# Patient Record
Sex: FEMALE | Race: WHITE | NOT HISPANIC OR LATINO | Employment: UNEMPLOYED | URBAN - METROPOLITAN AREA
[De-identification: names, ages, dates, MRNs, and addresses within clinical notes are randomized per-mention and may not be internally consistent; named-entity substitution may affect disease eponyms.]

---

## 2023-04-26 DIAGNOSIS — F41.1 GENERALIZED ANXIETY DISORDER: ICD-10-CM

## 2023-04-26 PROBLEM — H92.09 EAR PAIN: Status: ACTIVE | Noted: 2023-04-26

## 2023-04-26 PROBLEM — O24.419 GESTATIONAL DIABETES MELLITUS (HHS-HCC): Status: ACTIVE | Noted: 2023-04-26

## 2023-04-26 PROBLEM — H61.21 IMPACTED CERUMEN OF RIGHT EAR: Status: ACTIVE | Noted: 2023-04-26

## 2023-04-26 PROBLEM — H93.8X1 SENSATION OF FULLNESS IN RIGHT EAR: Status: ACTIVE | Noted: 2023-04-26

## 2023-04-26 PROBLEM — H66.92 OTITIS MEDIA, LEFT: Status: ACTIVE | Noted: 2023-04-26

## 2023-04-26 PROBLEM — J32.9 SINUSITIS: Status: ACTIVE | Noted: 2023-04-26

## 2023-04-26 RX ORDER — VIT C/E/ZN/COPPR/LUTEIN/ZEAXAN 250MG-90MG
CAPSULE ORAL
COMMUNITY

## 2023-04-26 RX ORDER — SERTRALINE HYDROCHLORIDE 50 MG/1
50 TABLET, FILM COATED ORAL DAILY
Qty: 90 TABLET | Refills: 0 | Status: SHIPPED | OUTPATIENT
Start: 2023-04-26 | End: 2023-06-30 | Stop reason: WASHOUT

## 2023-04-26 RX ORDER — MULTIVIT-MIN/IRON/FOLIC ACID/K 18-600-40
CAPSULE ORAL
COMMUNITY

## 2023-04-26 RX ORDER — SERTRALINE HYDROCHLORIDE 50 MG/1
50 TABLET, FILM COATED ORAL DAILY
COMMUNITY
End: 2023-05-03 | Stop reason: ALTCHOICE

## 2023-05-01 PROBLEM — N75.1 ABSCESS OF BARTHOLIN'S GLAND: Status: ACTIVE | Noted: 2023-03-15

## 2023-05-01 RX ORDER — ONDANSETRON 4 MG/1
4 TABLET, ORALLY DISINTEGRATING ORAL
COMMUNITY
Start: 2023-03-15 | End: 2023-05-03 | Stop reason: ALTCHOICE

## 2023-05-01 ASSESSMENT — PROMIS GLOBAL HEALTH SCALE
EMOTIONAL_PROBLEMS: OFTEN
RATE_SOCIAL_SATISFACTION: FAIR
RATE_PHYSICAL_HEALTH: GOOD
RATE_MENTAL_HEALTH: FAIR
RATE_GENERAL_HEALTH: GOOD
CARRYOUT_PHYSICAL_ACTIVITIES: COMPLETELY
RATE_AVERAGE_FATIGUE: MILD
RATE_AVERAGE_PAIN: 0
CARRYOUT_SOCIAL_ACTIVITIES: GOOD
RATE_QUALITY_OF_LIFE: GOOD

## 2023-05-02 PROBLEM — H61.21 IMPACTED CERUMEN OF RIGHT EAR: Status: RESOLVED | Noted: 2023-04-26 | Resolved: 2023-05-02

## 2023-05-02 PROBLEM — J32.9 SINUSITIS: Status: RESOLVED | Noted: 2023-04-26 | Resolved: 2023-05-02

## 2023-05-02 PROBLEM — H92.09 EAR PAIN: Status: RESOLVED | Noted: 2023-04-26 | Resolved: 2023-05-02

## 2023-05-02 PROBLEM — E87.6 ACUTE HYPOKALEMIA: Status: ACTIVE | Noted: 2023-03-15

## 2023-05-02 PROBLEM — H66.92 OTITIS MEDIA, LEFT: Status: RESOLVED | Noted: 2023-04-26 | Resolved: 2023-05-02

## 2023-05-02 PROBLEM — H93.8X1 SENSATION OF FULLNESS IN RIGHT EAR: Status: RESOLVED | Noted: 2023-04-26 | Resolved: 2023-05-02

## 2023-05-02 PROBLEM — F41.9 ANXIETY: Status: ACTIVE | Noted: 2023-05-02

## 2023-05-02 NOTE — PROGRESS NOTES
"Subjective   Patient ID: Alina Carter is a 33 y.o. female who presents for Annual Exam and Anxiety.    HPI     The patient presents for her annual wellness exam. GYN- Sandra  Had bartholin's cyst drained by Dr. Flores- both sides- then had right marsupialization surgery- doing much better now   Last pap/cervical cancer screenin   LMP/menstrual cycles: monthly, normal   Contraception:  vasectomy   History of depression or anxiety: anxiety- on zoloft 50 mg- seems like anxiety has been worse since she went to [place] in February- most days just feeling more anxious, would like to go up on dose   Immunizations: utd   Diet: Follows a healthy diet  Exercise: Gets regular exercise - walks , yoga     Review of Systems   Constitutional:  Negative for chills, fatigue and fever.   HENT:  Negative for congestion, ear pain and sore throat.    Eyes:  Negative for visual disturbance.   Respiratory:  Negative for cough and shortness of breath.    Cardiovascular:  Negative for chest pain, palpitations and leg swelling.   Gastrointestinal:  Negative for abdominal pain, constipation, diarrhea, nausea and vomiting.   Genitourinary: Negative.    Musculoskeletal: Negative.    Skin:  Negative for rash.   Neurological:  Negative for dizziness, weakness, numbness and headaches.   Psychiatric/Behavioral: Negative.         Objective   /73 (BP Location: Left arm, Patient Position: Sitting, BP Cuff Size: Adult)   Pulse 78   Temp 36.5 °C (97.7 °F) (Temporal)   Resp 16   Ht 1.702 m (5' 7\")   Wt 55.3 kg (122 lb)   LMP 2023 (Exact Date)   SpO2 99%   BMI 19.11 kg/m²     Physical Exam    Constitutional: Well developed, well nourished, alert and in no acute distress.  Head and Face: Normocephalic, atraumatic.  Eyes: Normal external exam. Pupils equally round and reactive to light with normal accommodation and extraocular movements intact.   ENT: External inspection of ears normal, tympanic membranes visualized and " normal. Nasal mucosa, septum, and turbinates normal. Oral mucosa moist, oropharynx clear.   Neck: Supple, no lymphadenopathy or masses. Thyroid not enlarged, no palpable nodules.   Cardiovascular: Regular rate and rhythm, normal S1 and S2, no murmurs, gallops, or rubs. Radial pulses normal. No peripheral edema. No carotid bruits.   Pulmonary: No respiratory distress, lungs clear to auscultation bilaterally. No wheezes, rhonchi, rales.   Abdomen: Soft, nontender, nondistended, normal bowel sounds. No masses palpated.   Musculoskeletal: Gait normal. Muscle strength/tone normal of all 4 extremities. Normal range of motion of all extremities.   Skin: Warm, well perfused, normal skin turgor and color, no lesions or rashes noted.   Neurologic: Cranial nerves II-XII grossly intact. Sensation normal bilaterally.   Psychiatric: Mood calm and affect normal.      Assessment/Plan   Problem List Items Addressed This Visit          Endocrine/Metabolic    Gestational diabetes mellitus    Relevant Orders    Hemoglobin A1C       Other    Generalized anxiety disorder     Increase zoloft to 75 mg daily   Counseling info given   Follow up as needed          Relevant Medications    sertraline (Zoloft) 50 mg tablet     Other Visit Diagnoses       Annual physical exam    -  Primary    Relevant Orders    CBC and Auto Differential    Basic Metabolic Panel    TSH with reflex to Free T4 if abnormal    Vitamin D 25-Hydroxy,Total    Follow Up In [facility] - PCP          Cherise Soni,   5/3/2023

## 2023-05-03 ENCOUNTER — LAB (OUTPATIENT)
Dept: LAB | Facility: LAB | Age: 33
End: 2023-05-03
Payer: COMMERCIAL

## 2023-05-03 ENCOUNTER — OFFICE VISIT (OUTPATIENT)
Dept: PRIMARY CARE | Facility: CLINIC | Age: 33
End: 2023-05-03
Payer: COMMERCIAL

## 2023-05-03 VITALS
HEART RATE: 78 BPM | WEIGHT: 122 LBS | OXYGEN SATURATION: 99 % | RESPIRATION RATE: 16 BRPM | DIASTOLIC BLOOD PRESSURE: 73 MMHG | BODY MASS INDEX: 19.15 KG/M2 | SYSTOLIC BLOOD PRESSURE: 108 MMHG | HEIGHT: 67 IN | TEMPERATURE: 97.7 F

## 2023-05-03 DIAGNOSIS — O24.419 GESTATIONAL DIABETES MELLITUS (GDM), ANTEPARTUM, GESTATIONAL DIABETES METHOD OF CONTROL UNSPECIFIED (HHS-HCC): ICD-10-CM

## 2023-05-03 DIAGNOSIS — Z00.00 ANNUAL PHYSICAL EXAM: ICD-10-CM

## 2023-05-03 DIAGNOSIS — Z00.00 ANNUAL PHYSICAL EXAM: Primary | ICD-10-CM

## 2023-05-03 DIAGNOSIS — F41.1 GENERALIZED ANXIETY DISORDER: ICD-10-CM

## 2023-05-03 PROBLEM — E87.6 ACUTE HYPOKALEMIA: Status: RESOLVED | Noted: 2023-03-15 | Resolved: 2023-05-03

## 2023-05-03 PROBLEM — F41.9 ANXIETY: Status: RESOLVED | Noted: 2023-05-02 | Resolved: 2023-05-03

## 2023-05-03 PROBLEM — N75.1 ABSCESS OF BARTHOLIN'S GLAND: Status: RESOLVED | Noted: 2023-03-15 | Resolved: 2023-05-03

## 2023-05-03 PROCEDURE — 83036 HEMOGLOBIN GLYCOSYLATED A1C: CPT

## 2023-05-03 PROCEDURE — 3078F DIAST BP <80 MM HG: CPT | Performed by: FAMILY MEDICINE

## 2023-05-03 PROCEDURE — 80048 BASIC METABOLIC PNL TOTAL CA: CPT

## 2023-05-03 PROCEDURE — 84443 ASSAY THYROID STIM HORMONE: CPT

## 2023-05-03 PROCEDURE — 1036F TOBACCO NON-USER: CPT | Performed by: FAMILY MEDICINE

## 2023-05-03 PROCEDURE — 82306 VITAMIN D 25 HYDROXY: CPT

## 2023-05-03 PROCEDURE — 85025 COMPLETE CBC W/AUTO DIFF WBC: CPT

## 2023-05-03 PROCEDURE — 99395 PREV VISIT EST AGE 18-39: CPT | Performed by: FAMILY MEDICINE

## 2023-05-03 PROCEDURE — 3074F SYST BP LT 130 MM HG: CPT | Performed by: FAMILY MEDICINE

## 2023-05-03 PROCEDURE — 36415 COLL VENOUS BLD VENIPUNCTURE: CPT

## 2023-05-03 RX ORDER — SERTRALINE HYDROCHLORIDE 50 MG/1
75 TABLET, FILM COATED ORAL DAILY
Qty: 135 TABLET | Refills: 3 | Status: SHIPPED | OUTPATIENT
Start: 2023-05-03 | End: 2023-07-26 | Stop reason: SDUPTHER

## 2023-05-03 ASSESSMENT — ENCOUNTER SYMPTOMS
VOMITING: 0
DIARRHEA: 0
DIZZINESS: 0
MUSCULOSKELETAL NEGATIVE: 1
SHORTNESS OF BREATH: 0
PSYCHIATRIC NEGATIVE: 1
NUMBNESS: 0
CHILLS: 0
WEAKNESS: 0
CONSTIPATION: 0
FEVER: 0
NAUSEA: 0
FATIGUE: 0
PALPITATIONS: 0
HEADACHES: 0
ABDOMINAL PAIN: 0
COUGH: 0
SORE THROAT: 0

## 2023-05-03 ASSESSMENT — ANXIETY QUESTIONNAIRES
4. TROUBLE RELAXING: MORE THAN HALF THE DAYS
IF YOU CHECKED OFF ANY PROBLEMS ON THIS QUESTIONNAIRE, HOW DIFFICULT HAVE THESE PROBLEMS MADE IT FOR YOU TO DO YOUR WORK, TAKE CARE OF THINGS AT HOME, OR GET ALONG WITH OTHER PEOPLE: SOMEWHAT DIFFICULT
5. BEING SO RESTLESS THAT IT IS HARD TO SIT STILL: SEVERAL DAYS
1. FEELING NERVOUS, ANXIOUS, OR ON EDGE: MORE THAN HALF THE DAYS
7. FEELING AFRAID AS IF SOMETHING AWFUL MIGHT HAPPEN: SEVERAL DAYS
6. BECOMING EASILY ANNOYED OR IRRITABLE: MORE THAN HALF THE DAYS
3. WORRYING TOO MUCH ABOUT DIFFERENT THINGS: MORE THAN HALF THE DAYS
GAD7 TOTAL SCORE: 12
2. NOT BEING ABLE TO STOP OR CONTROL WORRYING: MORE THAN HALF THE DAYS

## 2023-05-03 ASSESSMENT — PATIENT HEALTH QUESTIONNAIRE - PHQ9
1. LITTLE INTEREST OR PLEASURE IN DOING THINGS: NOT AT ALL
2. FEELING DOWN, DEPRESSED OR HOPELESS: NOT AT ALL
SUM OF ALL RESPONSES TO PHQ9 QUESTIONS 1 AND 2: 0

## 2023-05-03 NOTE — PATIENT INSTRUCTIONS
Recommendations for women annual wellness exam:   Make sure screenings for cervical and breast cancer are up to date if applicable- pap smears age 21-65, discuss mammogram starting at age 40 or sooner if positive family history of breast cancer; colonoscopy at age 45, earlier if positive family history of colon cancer   STD screening   Follow a healthy diet (Dash diet, Mediterranean diet)  Exercise 150 min/wk   Maintain healthy weight (BMI < 25)   Do not smoke   Alcohol in moderation (up to 1 drink/day)  Get enough sleep (7-8 hours/night)  Take a prenatal vitamin with folic acid if possibility of pregnancy   Make sure immunizations are up to date (influenza, Tdap)  Premenopausal women need minimum 1,000 mg calcium and 600-800 IU vitamin D daily (combination of diet + supplement)  Talk to your physician if you have concerns about depression or anxiety  Visit dentist twice yearly    [facility]  [address] [place] [address]  [phone]    [facility]  [email or web address]  [address] [facility]  [place] [address]  Phone [phone]    [facility]  <[email or web address]  [address] [place] [address]   Phone [phone]    [facility]  [address] [address]  [phone]    [facility]  [address] [address]  [phone]     [facility]   [email or web address]   [address] [place] [address]  [phone]    [facility]  [address] [facility] [address] [address]  [phone]    [facility]  <[email or web address]  [address] [facility] [address]   Phone [phone]    [facility]  <[email or web address]  [address] [place] [address]  [phone]    [facility]  <[email or web address]   [address] [place] [address], [facility] [address], [place] [address]  Phone: [phone]

## 2023-05-04 LAB
ANION GAP IN SER/PLAS: 12 MMOL/L (ref 10–20)
BASOPHILS (10*3/UL) IN BLOOD BY AUTOMATED COUNT: 0.04 X10E9/L (ref 0–0.1)
BASOPHILS/100 LEUKOCYTES IN BLOOD BY AUTOMATED COUNT: 0.6 % (ref 0–2)
CALCIDIOL (25 OH VITAMIN D3) (NG/ML) IN SER/PLAS: 31 NG/ML
CALCIUM (MG/DL) IN SER/PLAS: 10 MG/DL (ref 8.6–10.6)
CARBON DIOXIDE, TOTAL (MMOL/L) IN SER/PLAS: 28 MMOL/L (ref 21–32)
CHLORIDE (MMOL/L) IN SER/PLAS: 103 MMOL/L (ref 98–107)
CREATININE (MG/DL) IN SER/PLAS: 0.79 MG/DL (ref 0.5–1.05)
EOSINOPHILS (10*3/UL) IN BLOOD BY AUTOMATED COUNT: 0.13 X10E9/L (ref 0–0.7)
EOSINOPHILS/100 LEUKOCYTES IN BLOOD BY AUTOMATED COUNT: 2 % (ref 0–6)
ERYTHROCYTE DISTRIBUTION WIDTH (RATIO) BY AUTOMATED COUNT: 12.3 % (ref 11.5–14.5)
ERYTHROCYTE MEAN CORPUSCULAR HEMOGLOBIN CONCENTRATION (G/DL) BY AUTOMATED: 32.5 G/DL (ref 32–36)
ERYTHROCYTE MEAN CORPUSCULAR VOLUME (FL) BY AUTOMATED COUNT: 89 FL (ref 80–100)
ERYTHROCYTES (10*6/UL) IN BLOOD BY AUTOMATED COUNT: 5.1 X10E12/L (ref 4–5.2)
ESTIMATED AVERAGE GLUCOSE FOR HBA1C: 103 MG/DL
GFR FEMALE: >90 ML/MIN/1.73M2
GLUCOSE (MG/DL) IN SER/PLAS: 95 MG/DL (ref 74–99)
HEMATOCRIT (%) IN BLOOD BY AUTOMATED COUNT: 45.5 % (ref 36–46)
HEMOGLOBIN (G/DL) IN BLOOD: 14.8 G/DL (ref 12–16)
HEMOGLOBIN A1C/HEMOGLOBIN TOTAL IN BLOOD: 5.2 %
IMMATURE GRANULOCYTES/100 LEUKOCYTES IN BLOOD BY AUTOMATED COUNT: 0.2 % (ref 0–0.9)
LEUKOCYTES (10*3/UL) IN BLOOD BY AUTOMATED COUNT: 6.4 X10E9/L (ref 4.4–11.3)
LYMPHOCYTES (10*3/UL) IN BLOOD BY AUTOMATED COUNT: 1.78 X10E9/L (ref 1.2–4.8)
LYMPHOCYTES/100 LEUKOCYTES IN BLOOD BY AUTOMATED COUNT: 28 % (ref 13–44)
MONOCYTES (10*3/UL) IN BLOOD BY AUTOMATED COUNT: 0.48 X10E9/L (ref 0.1–1)
MONOCYTES/100 LEUKOCYTES IN BLOOD BY AUTOMATED COUNT: 7.5 % (ref 2–10)
NEUTROPHILS (10*3/UL) IN BLOOD BY AUTOMATED COUNT: 3.92 X10E9/L (ref 1.2–7.7)
NEUTROPHILS/100 LEUKOCYTES IN BLOOD BY AUTOMATED COUNT: 61.7 % (ref 40–80)
NRBC (PER 100 WBCS) BY AUTOMATED COUNT: 0 /100 WBC (ref 0–0)
PLATELETS (10*3/UL) IN BLOOD AUTOMATED COUNT: 209 X10E9/L (ref 150–450)
POTASSIUM (MMOL/L) IN SER/PLAS: 4 MMOL/L (ref 3.5–5.3)
SODIUM (MMOL/L) IN SER/PLAS: 139 MMOL/L (ref 136–145)
THYROTROPIN (MIU/L) IN SER/PLAS BY DETECTION LIMIT <= 0.05 MIU/L: 1.24 MIU/L (ref 0.44–3.98)
UREA NITROGEN (MG/DL) IN SER/PLAS: 13 MG/DL (ref 6–23)

## 2023-06-29 ENCOUNTER — TELEPHONE (OUTPATIENT)
Dept: PRIMARY CARE | Facility: CLINIC | Age: 33
End: 2023-06-29
Payer: COMMERCIAL

## 2023-06-29 NOTE — TELEPHONE ENCOUNTER
----- Message from Guillermina Frias CMA sent at 6/29/2023  1:38 PM EDT -----  Regarding: FW: Sertraline symptoms  Contact: [phone]    ----- Message -----  From: Alina Carter  Sent: 6/29/2023   1:37 PM EDT  To: Do Bhatia[address] [place] [address] Clinical Support Staff  Subject: Sertraline symptoms                              Okay. Not sure if this would be related to the same side effects but since Tuesday afternoon, I've also had diarrhea and a lot of cramping and aching over most of my abdomen that has radiated to my back. I have barely any appetite and when I eat my stomach seems to hurt more. I also have this gnawing constant pain in the middle of my upper abdomen. I haven't had a fever or anything else that really suggests a stomach bug but I'm wondering when or if this would warrant a doctor visit?

## 2023-06-30 ENCOUNTER — OFFICE VISIT (OUTPATIENT)
Dept: PRIMARY CARE | Facility: CLINIC | Age: 33
End: 2023-06-30
Payer: COMMERCIAL

## 2023-06-30 ENCOUNTER — LAB (OUTPATIENT)
Dept: LAB | Facility: LAB | Age: 33
End: 2023-06-30
Payer: COMMERCIAL

## 2023-06-30 VITALS
BODY MASS INDEX: 19 KG/M2 | OXYGEN SATURATION: 98 % | TEMPERATURE: 98.4 F | WEIGHT: 121.3 LBS | SYSTOLIC BLOOD PRESSURE: 109 MMHG | HEART RATE: 81 BPM | DIASTOLIC BLOOD PRESSURE: 72 MMHG | RESPIRATION RATE: 16 BRPM

## 2023-06-30 DIAGNOSIS — R19.7 DIARRHEA, UNSPECIFIED TYPE: ICD-10-CM

## 2023-06-30 DIAGNOSIS — R10.84 GENERALIZED ABDOMINAL PAIN: ICD-10-CM

## 2023-06-30 DIAGNOSIS — R10.84 GENERALIZED ABDOMINAL PAIN: Primary | ICD-10-CM

## 2023-06-30 DIAGNOSIS — R10.9 ABDOMINAL PAIN, UNSPECIFIED ABDOMINAL LOCATION: ICD-10-CM

## 2023-06-30 PROCEDURE — 80048 BASIC METABOLIC PNL TOTAL CA: CPT

## 2023-06-30 PROCEDURE — 99214 OFFICE O/P EST MOD 30 MIN: CPT | Performed by: FAMILY MEDICINE

## 2023-06-30 PROCEDURE — 80076 HEPATIC FUNCTION PANEL: CPT

## 2023-06-30 PROCEDURE — 3078F DIAST BP <80 MM HG: CPT | Performed by: FAMILY MEDICINE

## 2023-06-30 PROCEDURE — 36415 COLL VENOUS BLD VENIPUNCTURE: CPT

## 2023-06-30 PROCEDURE — 83690 ASSAY OF LIPASE: CPT

## 2023-06-30 PROCEDURE — 85027 COMPLETE CBC AUTOMATED: CPT

## 2023-06-30 PROCEDURE — 82150 ASSAY OF AMYLASE: CPT

## 2023-06-30 PROCEDURE — 1036F TOBACCO NON-USER: CPT | Performed by: FAMILY MEDICINE

## 2023-06-30 PROCEDURE — 3044F HG A1C LEVEL LT 7.0%: CPT | Performed by: FAMILY MEDICINE

## 2023-06-30 PROCEDURE — 3074F SYST BP LT 130 MM HG: CPT | Performed by: FAMILY MEDICINE

## 2023-06-30 ASSESSMENT — PAIN SCALES - GENERAL: PAINLEVEL: 4

## 2023-06-30 NOTE — PROGRESS NOTES
Subjective:      Alina Carter is a 33 y.o. female who presents for     [facility] pt     HPI:      Alina Carter. is a  33 y.o..  female. Who is here for acute visit today.    PCP is- [facility]    Chief Complaint   Patient presents with    Abdominal Pain    Diarrhea     Approx 2-3 episodes/day- urgency    Abdominal Cramping    Anorexia     All sxs constant x3 days  PT denies fever, nausea, vomiting and blood in stool  Eating exacerbates sxs  Pt has taken TUMS-not effective in alleviating sxs  Of Note: PT's sertraline dosage was increased from 50mg to 75 mg approx 2 weeks ago       Pt denies any urinary sxs     recent travel? no  camping or hiking? no  does pt have well water? no  has pt taken antibiotics within past 2 weeks? no    has pt been seen recently for this problem ( within past 2-3 weeks) ? No    if yes- where? N/A    by who? N/A    what treatment was provided? N/A           had vasectomy       Social History     Tobacco Use   Smoking Status Never    Passive exposure: Past   Smokeless Tobacco Never         Review of Systems:        Objective:      Vitals:    06/30/23 1240   BP: 109/72   BP Location: Left arm   Patient Position: Sitting   BP Cuff Size: Adult   Pulse: 81   Resp: 16   Temp: 36.9 °C (98.4 °F)   SpO2: 98%   Weight: 55 kg (121 lb 4.8 oz)           Pt is A and O x3, NAD, nontoxic, well-hydrated   Head- normocephalic and atraumatic,   EYES- conjunctiva- normal   lids- normal  EARS/NOSE- normal external exam   CV- RRR without murmur  PULM- CTA bilaterally, normal respiratory effort  RESPIRATORY EFFORT- normal , no retractions or nasal flaring   ABD- normoactive BS's , soft, no HSM, no CVAT, NT   EXT- no edema,NT  SKIN- no abnormal skin lesions noted  NEURO- no focal deficits  PSYCH- pleasant, normal judgement and insight    The number and complexity of problems addressed is considered moderate.  The amount and/or complexity of data reviewed and analyzed is considered moderate. The risk of complications  and/or morbidity/mortality of patient is considered moderate. Overall, this patient encounter is considered a moderate risk visit.              Assessment/Plan:    1. Generalized abdominal pain  US gallbladder    CBC      2. Diarrhea, unspecified type  US gallbladder    CBC    Giardia/Cryptosporidium Antigens    Ova/Para + Giardia/Cryptosporidium Antigen    Occult Blood, Stool    Stool Pathogen Panel, PCR      3. Abdominal pain, unspecified abdominal location  US gallbladder    Amylase    Basic Metabolic Panel    Hepatic Function Panel    Lipase          Orders Placed This Encounter   Procedures    Occult Blood, Stool    Stool Pathogen Panel, PCR    US gallbladder    Amylase    Basic Metabolic Panel    CBC    Hepatic Function Panel    Lipase    Giardia/Cryptosporidium Antigens    Ova/Para + Giardia/Cryptosporidium Antigen         Ordered lab and Gallbladder ultrasound         Call if no better or if symptoms worsen

## 2023-07-01 LAB
ALANINE AMINOTRANSFERASE (SGPT) (U/L) IN SER/PLAS: 7 U/L (ref 7–45)
ALBUMIN (G/DL) IN SER/PLAS: 4.7 G/DL (ref 3.4–5)
ALKALINE PHOSPHATASE (U/L) IN SER/PLAS: 45 U/L (ref 33–110)
AMYLASE (U/L) IN SER/PLAS: 82 U/L (ref 29–103)
ANION GAP IN SER/PLAS: 14 MMOL/L (ref 10–20)
ASPARTATE AMINOTRANSFERASE (SGOT) (U/L) IN SER/PLAS: 15 U/L (ref 9–39)
BILIRUBIN DIRECT (MG/DL) IN SER/PLAS: 0.1 MG/DL (ref 0–0.3)
BILIRUBIN TOTAL (MG/DL) IN SER/PLAS: 0.8 MG/DL (ref 0–1.2)
CALCIUM (MG/DL) IN SER/PLAS: 9.6 MG/DL (ref 8.6–10.6)
CARBON DIOXIDE, TOTAL (MMOL/L) IN SER/PLAS: 27 MMOL/L (ref 21–32)
CHLORIDE (MMOL/L) IN SER/PLAS: 102 MMOL/L (ref 98–107)
CREATININE (MG/DL) IN SER/PLAS: 0.72 MG/DL (ref 0.5–1.05)
ERYTHROCYTE DISTRIBUTION WIDTH (RATIO) BY AUTOMATED COUNT: 12.2 % (ref 11.5–14.5)
ERYTHROCYTE MEAN CORPUSCULAR HEMOGLOBIN CONCENTRATION (G/DL) BY AUTOMATED: 32.7 G/DL (ref 32–36)
ERYTHROCYTE MEAN CORPUSCULAR VOLUME (FL) BY AUTOMATED COUNT: 90 FL (ref 80–100)
ERYTHROCYTES (10*6/UL) IN BLOOD BY AUTOMATED COUNT: 4.76 X10E12/L (ref 4–5.2)
GFR FEMALE: >90 ML/MIN/1.73M2
GLUCOSE (MG/DL) IN SER/PLAS: 99 MG/DL (ref 74–99)
HEMATOCRIT (%) IN BLOOD BY AUTOMATED COUNT: 42.8 % (ref 36–46)
HEMOGLOBIN (G/DL) IN BLOOD: 14 G/DL (ref 12–16)
LEUKOCYTES (10*3/UL) IN BLOOD BY AUTOMATED COUNT: 6.8 X10E9/L (ref 4.4–11.3)
LIPASE (U/L) IN SER/PLAS: 59 U/L (ref 9–82)
NRBC (PER 100 WBCS) BY AUTOMATED COUNT: 0 /100 WBC (ref 0–0)
PLATELETS (10*3/UL) IN BLOOD AUTOMATED COUNT: 194 X10E9/L (ref 150–450)
POTASSIUM (MMOL/L) IN SER/PLAS: 3.9 MMOL/L (ref 3.5–5.3)
PROTEIN TOTAL: 7.5 G/DL (ref 6.4–8.2)
SODIUM (MMOL/L) IN SER/PLAS: 139 MMOL/L (ref 136–145)
UREA NITROGEN (MG/DL) IN SER/PLAS: 13 MG/DL (ref 6–23)

## 2023-07-25 NOTE — PROGRESS NOTES
"Subjective   Patient ID: Alina Carter is a 33 y.o. female who presents for Follow-up and Anxiety.    HPI     Here to follow up anxiety- zoloft increased to 75 mg in May   She saw Dr. Lopez 6/30/23 for abdominal pain and cramping, diarrhea- labs, stool studies, U/S GB ordered- labs normal, stool studies not done, U/S was normal ; Those symptoms have since resolved   She then went back to taking 50 mg zoloft instead and feels much better there.  Most days are good.  Has never been on other meds.  Doesn't want to change at this time.      Low back pain worse in mornings x 1 year   5/10 in severity when she wakes up   Gets better as day goes on  Denies much stiffness  No prior xray   No paresthesias   Some sciatica right side to knee intermittently   No falls or weakness   Does not like taking OTC meds, not bad enough at this time     Review of Systems   Constitutional:  Negative for chills, fatigue and fever.   Respiratory:  Negative for cough and shortness of breath.    Cardiovascular:  Negative for chest pain and palpitations.   Musculoskeletal:  Positive for back pain.       Objective   /63 (BP Location: Left arm, Patient Position: Sitting, BP Cuff Size: Adult)   Pulse 73   Temp 36.9 °C (98.5 °F) (Temporal)   Resp 16   Ht 1.702 m (5' 7\")   Wt 56.6 kg (124 lb 11.2 oz)   LMP 07/01/2023 (Exact Date)   SpO2 99%   BMI 19.53 kg/m²     Physical Exam    Constitutional: Well developed, well nourished, alert and in no acute distress   Eyes: Normal external exam.   LUMBAR SPINE: Normal visual inspection, no erythema, warmth, or swelling noted. No paraspinal muscle hypertrophy or spasm. No spinal tenderness. Normal range of motion to flexion, extension, right side bending, left side bending, right rotation, left rotation. Muscle strength +5/5 to hip flexion, knee extension, foot dorsiflexion, foot plantarflexion, and EHL bilaterally (L2-S1). Sensation of lower extremities intact. Negative seated straight leg " raise. DTR +3/4. Normal distal pulses.  Skin: Warm, well perfused, normal skin turgor and color.   Neurologic: Cranial nerves II-XII grossly intact.   Psychiatric: Mood calm and affect normal.      Assessment/Plan   Problem List Items Addressed This Visit       Generalized anxiety disorder     Continue zoloft 50 mg         Relevant Medications    sertraline (Zoloft) 50 mg tablet    Chronic right-sided low back pain with right-sided sciatica - Primary     Xray ordered  Home exercises given   Consider PT   Follow up as needed          Relevant Orders    XR lumbar spine complete 4+ views    [id number] Typing    Sedimentation Rate    C-Reactive Protein    Hyperreflexic     Cherise Soni, DO  7/26/2023

## 2023-07-26 ENCOUNTER — LAB (OUTPATIENT)
Dept: LAB | Facility: LAB | Age: 33
End: 2023-07-26
Payer: COMMERCIAL

## 2023-07-26 ENCOUNTER — OFFICE VISIT (OUTPATIENT)
Dept: PRIMARY CARE | Facility: CLINIC | Age: 33
End: 2023-07-26
Payer: COMMERCIAL

## 2023-07-26 VITALS
HEART RATE: 73 BPM | OXYGEN SATURATION: 99 % | DIASTOLIC BLOOD PRESSURE: 63 MMHG | SYSTOLIC BLOOD PRESSURE: 102 MMHG | TEMPERATURE: 98.5 F | HEIGHT: 67 IN | BODY MASS INDEX: 19.57 KG/M2 | RESPIRATION RATE: 16 BRPM | WEIGHT: 124.7 LBS

## 2023-07-26 DIAGNOSIS — G89.29 CHRONIC RIGHT-SIDED LOW BACK PAIN WITH RIGHT-SIDED SCIATICA: Primary | ICD-10-CM

## 2023-07-26 DIAGNOSIS — M54.41 CHRONIC RIGHT-SIDED LOW BACK PAIN WITH RIGHT-SIDED SCIATICA: Primary | ICD-10-CM

## 2023-07-26 DIAGNOSIS — F41.1 GENERALIZED ANXIETY DISORDER: ICD-10-CM

## 2023-07-26 DIAGNOSIS — M54.41 CHRONIC RIGHT-SIDED LOW BACK PAIN WITH RIGHT-SIDED SCIATICA: ICD-10-CM

## 2023-07-26 DIAGNOSIS — R29.2 HYPERREFLEXIC: ICD-10-CM

## 2023-07-26 DIAGNOSIS — G89.29 CHRONIC RIGHT-SIDED LOW BACK PAIN WITH RIGHT-SIDED SCIATICA: ICD-10-CM

## 2023-07-26 PROCEDURE — 81381 HLA I TYPING 1 ALLELE HR: CPT

## 2023-07-26 PROCEDURE — 86140 C-REACTIVE PROTEIN: CPT

## 2023-07-26 PROCEDURE — 1036F TOBACCO NON-USER: CPT | Performed by: FAMILY MEDICINE

## 2023-07-26 PROCEDURE — 36415 COLL VENOUS BLD VENIPUNCTURE: CPT

## 2023-07-26 PROCEDURE — 99214 OFFICE O/P EST MOD 30 MIN: CPT | Performed by: FAMILY MEDICINE

## 2023-07-26 PROCEDURE — 85652 RBC SED RATE AUTOMATED: CPT

## 2023-07-26 PROCEDURE — 3078F DIAST BP <80 MM HG: CPT | Performed by: FAMILY MEDICINE

## 2023-07-26 PROCEDURE — 3044F HG A1C LEVEL LT 7.0%: CPT | Performed by: FAMILY MEDICINE

## 2023-07-26 PROCEDURE — 3074F SYST BP LT 130 MM HG: CPT | Performed by: FAMILY MEDICINE

## 2023-07-26 RX ORDER — SERTRALINE HYDROCHLORIDE 50 MG/1
50 TABLET, FILM COATED ORAL DAILY
Qty: 90 TABLET | Refills: 3 | Status: SHIPPED | OUTPATIENT
Start: 2023-07-26 | End: 2024-05-10 | Stop reason: SDUPTHER

## 2023-07-26 ASSESSMENT — PATIENT HEALTH QUESTIONNAIRE - PHQ9
3. TROUBLE FALLING OR STAYING ASLEEP OR SLEEPING TOO MUCH: NOT AT ALL
9. THOUGHTS THAT YOU WOULD BE BETTER OFF DEAD, OR OF HURTING YOURSELF: NOT AT ALL
4. FEELING TIRED OR HAVING LITTLE ENERGY: SEVERAL DAYS
1. LITTLE INTEREST OR PLEASURE IN DOING THINGS: NOT AT ALL
2. FEELING DOWN, DEPRESSED OR HOPELESS: NOT AT ALL
SUM OF ALL RESPONSES TO PHQ QUESTIONS 1-9: 1
6. FEELING BAD ABOUT YOURSELF - OR THAT YOU ARE A FAILURE OR HAVE LET YOURSELF OR YOUR FAMILY DOWN: NOT AT ALL
5. POOR APPETITE OR OVEREATING: NOT AT ALL
8. MOVING OR SPEAKING SO SLOWLY THAT OTHER PEOPLE COULD HAVE NOTICED. OR THE OPPOSITE, BEING SO FIGETY OR RESTLESS THAT YOU HAVE BEEN MOVING AROUND A LOT MORE THAN USUAL: NOT AT ALL
7. TROUBLE CONCENTRATING ON THINGS, SUCH AS READING THE NEWSPAPER OR WATCHING TELEVISION: NOT AT ALL
10. IF YOU CHECKED OFF ANY PROBLEMS, HOW DIFFICULT HAVE THESE PROBLEMS MADE IT FOR YOU TO DO YOUR WORK, TAKE CARE OF THINGS AT HOME, OR GET ALONG WITH OTHER PEOPLE: NOT DIFFICULT AT ALL
SUM OF ALL RESPONSES TO PHQ9 QUESTIONS 1 AND 2: 0

## 2023-07-26 ASSESSMENT — ENCOUNTER SYMPTOMS
CHILLS: 0
FEVER: 0
COUGH: 0
PALPITATIONS: 0
SHORTNESS OF BREATH: 0
FATIGUE: 0
BACK PAIN: 1

## 2023-07-26 ASSESSMENT — ANXIETY QUESTIONNAIRES
GAD7 TOTAL SCORE: 3
4. TROUBLE RELAXING: SEVERAL DAYS
1. FEELING NERVOUS, ANXIOUS, OR ON EDGE: SEVERAL DAYS
2. NOT BEING ABLE TO STOP OR CONTROL WORRYING: NOT AT ALL
6. BECOMING EASILY ANNOYED OR IRRITABLE: NOT AT ALL
5. BEING SO RESTLESS THAT IT IS HARD TO SIT STILL: NOT AT ALL
7. FEELING AFRAID AS IF SOMETHING AWFUL MIGHT HAPPEN: NOT AT ALL
3. WORRYING TOO MUCH ABOUT DIFFERENT THINGS: SEVERAL DAYS
IF YOU CHECKED OFF ANY PROBLEMS ON THIS QUESTIONNAIRE, HOW DIFFICULT HAVE THESE PROBLEMS MADE IT FOR YOU TO DO YOUR WORK, TAKE CARE OF THINGS AT HOME, OR GET ALONG WITH OTHER PEOPLE: SOMEWHAT DIFFICULT

## 2023-07-27 LAB
C REACTIVE PROTEIN (MG/L) IN SER/PLAS: <0.1 MG/DL
SEDIMENTATION RATE, ERYTHROCYTE: <1 MM/H (ref 0–20)

## 2023-07-28 PROBLEM — M43.17 ANTEROLISTHESIS OF LUMBOSACRAL SPINE: Status: ACTIVE | Noted: 2023-07-28

## 2023-07-28 PROBLEM — M43.07 SPONDYLOLYSIS OF LUMBOSACRAL REGION: Status: ACTIVE | Noted: 2023-07-28

## 2023-07-31 DIAGNOSIS — M43.07 SPONDYLOLYSIS OF LUMBOSACRAL REGION: ICD-10-CM

## 2023-07-31 DIAGNOSIS — G89.29 CHRONIC RIGHT-SIDED LOW BACK PAIN WITH RIGHT-SIDED SCIATICA: Primary | ICD-10-CM

## 2023-07-31 DIAGNOSIS — M43.17 ANTEROLISTHESIS OF LUMBOSACRAL SPINE: ICD-10-CM

## 2023-07-31 DIAGNOSIS — M54.41 CHRONIC RIGHT-SIDED LOW BACK PAIN WITH RIGHT-SIDED SCIATICA: Primary | ICD-10-CM

## 2023-08-01 LAB — HLAB27 TYPING: NEGATIVE

## 2024-01-09 ASSESSMENT — ENCOUNTER SYMPTOMS
ABDOMINAL PAIN: 1
FLATUS: 1
BELCHING: 1
ANOREXIA: 1

## 2024-01-09 NOTE — PROGRESS NOTES
Subjective   Patient ID: Alina Carter is a 34 y.o. female who presents for Abdominal Pain (Middle to left upper quadrant pain 4-5 days /Worst after eating ).    Abdominal Pain  This is a recurrent problem. The current episode started in the past 7 days. The onset quality is sudden. The problem occurs constantly. The problem has been gradually worsening. The pain is located in the LUQ and epigastric region. The pain is at a severity of 7/10. The quality of the pain is aching, burning, cramping, dull and a sensation of fullness. The abdominal pain radiates to the LUQ and epigastric region. Associated symptoms include anorexia, belching and flatus. Pertinent negatives include no constipation, diarrhea, fever, nausea or vomiting. The pain is aggravated by certain positions and eating. The pain is relieved by Nothing.        She is here today for epigastric pain.  Pain x 5 days.    Notes gnawing pain, worse after eating.   Pain rated 7/10 at worst, 5/10 now.  No Nausea, vomiting  Occasional heartburn- tums as needed   Tried omeprazole before months ago but it upset her stomach so stopped   Denies prior hx of similar symptoms.    No diarrhea, constipation   Pain sometimes wraps around to back  No urinary symptoms   RUQ U/S neg 7/2023   She is on zoloft for anxiety   Family hx (IBD, celiac, IBS, cancer):  no   No hx peptic ulcer   Non smoker  Minimal alcohol  NSAIDs rare   Denies alarm features including involuntary weight loss, dysphagia, odynophagia, vomiting (bilious or projectile), chronic severe diarrhea (>3 loose stools per day for > 2 weeks) or nocturnal diarrhea, unexplained fever, urinary symptoms, back pain, bloody diarrhea, melena, skin changes, or family history of IBD, celiac disease, PUD.         Current Outpatient Medications   Medication Sig Dispense Refill    cholecalciferol (Vitamin D-3) 25 MCG (1000 UT) capsule Take by mouth.      multivitamin-min-iron-FA-vit K (Multi For Her) 18 mg iron-600 mcg-40 mcg  capsule Take by mouth.      sertraline (Zoloft) 50 mg tablet Take 1 tablet (50 mg) by mouth once daily. 90 tablet 3    pantoprazole (ProtoNix) 40 mg EC tablet Take 1 tablet (40 mg) by mouth once daily. Do not crush, chew, or split. 30 tablet 0     No current facility-administered medications for this visit.       Review of Systems   Constitutional:  Negative for chills and fever.   Gastrointestinal:  Positive for abdominal pain, anorexia and flatus. Negative for blood in stool, constipation, diarrhea, nausea and vomiting.   Genitourinary: Negative.    Musculoskeletal:  Positive for back pain.             Objective   /63 (BP Location: Left arm, Patient Position: Sitting, BP Cuff Size: Adult)   Pulse 80   Temp 36.7 °C (98.1 °F) (Temporal)   Resp 16   Wt 58.2 kg (128 lb 4.8 oz)   LMP 01/03/2024 (Exact Date)   SpO2 99%   BMI 20.09 kg/m²     Physical Exam    Constitutional: Well developed, well nourished, alert and in no acute distress   Eyes: Normal external exam.   Abdomen: Soft, nontender, nondistended, +BS.  Skin: Warm, well perfused, normal skin turgor and color.   Neurologic: Cranial nerves II-XII grossly intact.   Psychiatric: Mood calm and affect normal.      Assessment/Plan   Problem List Items Addressed This Visit    None  Visit Diagnoses         Codes    Epigastric pain    -  Primary R10.13    Relevant Medications    pantoprazole (ProtoNix) 40 mg EC tablet    Other Relevant Orders    CBC and Auto Differential    Comprehensive Metabolic Panel    Lipase    H. Pylori Antigen, Stool        Suspect gastritis vs peptic ulcer  Trial PPI x 30 days   Obtain labs and H pylori testing     Cherise Soni,   1/10/2024

## 2024-01-10 ENCOUNTER — LAB (OUTPATIENT)
Dept: LAB | Facility: LAB | Age: 34
End: 2024-01-10
Payer: COMMERCIAL

## 2024-01-10 ENCOUNTER — OFFICE VISIT (OUTPATIENT)
Dept: PRIMARY CARE | Facility: CLINIC | Age: 34
End: 2024-01-10
Payer: COMMERCIAL

## 2024-01-10 VITALS
RESPIRATION RATE: 16 BRPM | TEMPERATURE: 98.1 F | HEART RATE: 80 BPM | DIASTOLIC BLOOD PRESSURE: 63 MMHG | BODY MASS INDEX: 20.09 KG/M2 | SYSTOLIC BLOOD PRESSURE: 100 MMHG | OXYGEN SATURATION: 99 % | WEIGHT: 128.3 LBS

## 2024-01-10 DIAGNOSIS — R10.13 EPIGASTRIC PAIN: ICD-10-CM

## 2024-01-10 DIAGNOSIS — R10.13 EPIGASTRIC PAIN: Primary | ICD-10-CM

## 2024-01-10 PROCEDURE — 3078F DIAST BP <80 MM HG: CPT | Performed by: FAMILY MEDICINE

## 2024-01-10 PROCEDURE — 36415 COLL VENOUS BLD VENIPUNCTURE: CPT

## 2024-01-10 PROCEDURE — 80053 COMPREHEN METABOLIC PANEL: CPT

## 2024-01-10 PROCEDURE — 1036F TOBACCO NON-USER: CPT | Performed by: FAMILY MEDICINE

## 2024-01-10 PROCEDURE — 85025 COMPLETE CBC W/AUTO DIFF WBC: CPT

## 2024-01-10 PROCEDURE — 3074F SYST BP LT 130 MM HG: CPT | Performed by: FAMILY MEDICINE

## 2024-01-10 PROCEDURE — 99213 OFFICE O/P EST LOW 20 MIN: CPT | Performed by: FAMILY MEDICINE

## 2024-01-10 PROCEDURE — 83690 ASSAY OF LIPASE: CPT

## 2024-01-10 RX ORDER — PANTOPRAZOLE SODIUM 40 MG/1
40 TABLET, DELAYED RELEASE ORAL DAILY
Qty: 30 TABLET | Refills: 0 | Status: SHIPPED | OUTPATIENT
Start: 2024-01-10 | End: 2024-05-10 | Stop reason: ALTCHOICE

## 2024-01-10 ASSESSMENT — ENCOUNTER SYMPTOMS
FEVER: 0
NAUSEA: 0
CHILLS: 0
BELCHING: 1
DIARRHEA: 0
FLATUS: 1
VOMITING: 0
CONSTIPATION: 0
BLOOD IN STOOL: 0
ABDOMINAL PAIN: 1
BACK PAIN: 1
ANOREXIA: 1

## 2024-01-11 ENCOUNTER — LAB (OUTPATIENT)
Dept: LAB | Facility: LAB | Age: 34
End: 2024-01-11
Payer: COMMERCIAL

## 2024-01-11 DIAGNOSIS — R10.13 EPIGASTRIC PAIN: ICD-10-CM

## 2024-01-11 LAB
ALBUMIN SERPL BCP-MCNC: 4.8 G/DL (ref 3.4–5)
ALP SERPL-CCNC: 48 U/L (ref 33–110)
ALT SERPL W P-5'-P-CCNC: 10 U/L (ref 7–45)
ANION GAP SERPL CALC-SCNC: 12 MMOL/L (ref 10–20)
AST SERPL W P-5'-P-CCNC: 15 U/L (ref 9–39)
BASOPHILS # BLD AUTO: 0.07 X10*3/UL (ref 0–0.1)
BASOPHILS NFR BLD AUTO: 1 %
BILIRUB SERPL-MCNC: 0.5 MG/DL (ref 0–1.2)
BUN SERPL-MCNC: 13 MG/DL (ref 6–23)
CALCIUM SERPL-MCNC: 9.4 MG/DL (ref 8.6–10.6)
CHLORIDE SERPL-SCNC: 101 MMOL/L (ref 98–107)
CO2 SERPL-SCNC: 29 MMOL/L (ref 21–32)
CREAT SERPL-MCNC: 0.75 MG/DL (ref 0.5–1.05)
EGFRCR SERPLBLD CKD-EPI 2021: >90 ML/MIN/1.73M*2
EOSINOPHIL # BLD AUTO: 0.14 X10*3/UL (ref 0–0.7)
EOSINOPHIL NFR BLD AUTO: 2 %
ERYTHROCYTE [DISTWIDTH] IN BLOOD BY AUTOMATED COUNT: 12 % (ref 11.5–14.5)
GLUCOSE SERPL-MCNC: 102 MG/DL (ref 74–99)
HCT VFR BLD AUTO: 41.1 % (ref 36–46)
HGB BLD-MCNC: 13.6 G/DL (ref 12–16)
IMM GRANULOCYTES # BLD AUTO: 0.02 X10*3/UL (ref 0–0.7)
IMM GRANULOCYTES NFR BLD AUTO: 0.3 % (ref 0–0.9)
LIPASE SERPL-CCNC: 58 U/L (ref 9–82)
LYMPHOCYTES # BLD AUTO: 1.49 X10*3/UL (ref 1.2–4.8)
LYMPHOCYTES NFR BLD AUTO: 20.8 %
MCH RBC QN AUTO: 29.1 PG (ref 26–34)
MCHC RBC AUTO-ENTMCNC: 33.1 G/DL (ref 32–36)
MCV RBC AUTO: 88 FL (ref 80–100)
MONOCYTES # BLD AUTO: 0.52 X10*3/UL (ref 0.1–1)
MONOCYTES NFR BLD AUTO: 7.3 %
NEUTROPHILS # BLD AUTO: 4.93 X10*3/UL (ref 1.2–7.7)
NEUTROPHILS NFR BLD AUTO: 68.6 %
NRBC BLD-RTO: 0 /100 WBCS (ref 0–0)
PLATELET # BLD AUTO: 214 X10*3/UL (ref 150–450)
POTASSIUM SERPL-SCNC: 3.3 MMOL/L (ref 3.5–5.3)
PROT SERPL-MCNC: 7.5 G/DL (ref 6.4–8.2)
RBC # BLD AUTO: 4.67 X10*6/UL (ref 4–5.2)
SODIUM SERPL-SCNC: 139 MMOL/L (ref 136–145)
WBC # BLD AUTO: 7.2 X10*3/UL (ref 4.4–11.3)

## 2024-01-11 PROCEDURE — 87449 NOS EACH ORGANISM AG IA: CPT

## 2024-01-14 LAB — H PYLORI AG STL QL IA: NEGATIVE

## 2024-05-09 NOTE — PROGRESS NOTES
"Subjective   Patient ID: Alina Carter is a 34 y.o. female who presents for Annual Exam and Follow-up (Would like to start weaning off zoloft ).    HPI     The patient presents for her annual wellness exam. GYN- Sandra   Last pap/cervical cancer screening: due- will schedule   LMP/menstrual cycles: monthly   Contraception:  vasectomy   History of depression or anxiety: yes- on zoloft- would like to wean.  Feeling better overall, doesn't feel like she needs it anymore.    Immunizations: utd   Diet: Follows a healthy diet  Exercise: Gets regular exercise - weights, yoga, walks dog     She was seen in January for epigastric pain- started on PPI - took it for a few days then stopped \"because it made me feel weird\"   H pylori neg   It has since resolved on its own   No further concerns     Derm- [facility]     Current Outpatient Medications   Medication Sig Dispense Refill    cholecalciferol (Vitamin D-3) 25 MCG (1000 UT) capsule Take by mouth.      multivitamin-min-iron-FA-vit K (Multi For Her) 18 mg iron-600 mcg-40 mcg capsule Take by mouth.      sertraline (Zoloft) 25 mg tablet Take 1 tablet (25 mg) by mouth once daily. 90 tablet 3     No current facility-administered medications for this visit.       Review of Systems   Constitutional:  Negative for chills, fatigue and fever.   HENT:  Negative for congestion, ear pain and sore throat.    Eyes:  Negative for visual disturbance.   Respiratory:  Negative for cough and shortness of breath.    Cardiovascular:  Negative for chest pain, palpitations and leg swelling.   Gastrointestinal:  Negative for abdominal pain, constipation, diarrhea, nausea and vomiting.   Genitourinary: Negative.    Musculoskeletal: Negative.    Skin:  Negative for rash.   Neurological:  Negative for dizziness, weakness, numbness and headaches.   Psychiatric/Behavioral: Negative.           Scales reviewed    BON-7 Total Score: 2 (05/10/24 9715)  Patient Health Questionnaire-9 Score: 1 " "(05/10/24 0920)  Patient Health Questionnaire-2 Score: 0 (05/10/24 0920)       Objective   /69 (BP Location: Right arm, Patient Position: Sitting, BP Cuff Size: Adult)   Pulse 97   Temp 36.4 °C (97.5 °F) (Temporal)   Resp 16   Ht 1.702 m (5' 7\")   Wt 61.6 kg (135 lb 11.2 oz)   LMP 04/15/2024 (Exact Date)   SpO2 98%   BMI 21.25 kg/m²     Physical Exam    Constitutional: Well developed, well nourished, alert and in no acute distress.  Head and Face: Normocephalic, atraumatic.  Eyes: Normal external exam. Pupils equally round and reactive to light with normal accommodation and extraocular movements intact.   ENT: External inspection of ears normal, tympanic membranes visualized and normal. Nasal mucosa, septum, and turbinates normal. Oral mucosa moist, oropharynx clear.   Neck: Supple, no lymphadenopathy or masses. Thyroid not enlarged, no palpable nodules.   Cardiovascular: Regular rate and rhythm, normal S1 and S2, no murmurs, gallops, or rubs. Radial pulses normal. No peripheral edema. No carotid bruits.   Pulmonary: No respiratory distress, lungs clear to auscultation bilaterally. No wheezes, rhonchi, rales.   Abdomen: Soft, nontender, nondistended, normal bowel sounds. No masses palpated.   Musculoskeletal: Gait normal. Muscle strength/tone normal of all 4 extremities. Normal range of motion of all extremities.   Skin: Warm, well perfused, normal skin turgor and color, no lesions or rashes noted.   Neurologic: Cranial nerves II-XII grossly intact. Sensation normal bilaterally.   Psychiatric: Mood calm and affect normal.      Assessment/Plan   Problem List Items Addressed This Visit             ICD-10-CM    Generalized anxiety disorder F41.1     Reduce zoloft to 25 mg daily x 2 weeks  Can then reduce further to 12.5 mg daily x 2 weeks then stop   Also fine to remain on either 25 mg or 12.5 mg daily if so desired            Relevant Medications    sertraline (Zoloft) 25 mg tablet    History of " gestational diabetes Z86.32    Relevant Orders    Hemoglobin A1C     Other Visit Diagnoses         Codes    Need for hepatitis C screening test    -  Primary Z11.59    Relevant Orders    Hepatitis C Antibody    Annual physical exam     Z00.00    Relevant Orders    Lipid Panel            Cherise Soni DO  5/10/2024

## 2024-05-10 ENCOUNTER — LAB (OUTPATIENT)
Dept: LAB | Facility: LAB | Age: 34
End: 2024-05-10
Payer: COMMERCIAL

## 2024-05-10 ENCOUNTER — OFFICE VISIT (OUTPATIENT)
Dept: PRIMARY CARE | Facility: CLINIC | Age: 34
End: 2024-05-10
Payer: COMMERCIAL

## 2024-05-10 VITALS
BODY MASS INDEX: 21.3 KG/M2 | DIASTOLIC BLOOD PRESSURE: 69 MMHG | RESPIRATION RATE: 16 BRPM | WEIGHT: 135.7 LBS | HEIGHT: 67 IN | SYSTOLIC BLOOD PRESSURE: 107 MMHG | TEMPERATURE: 97.5 F | OXYGEN SATURATION: 98 % | HEART RATE: 97 BPM

## 2024-05-10 DIAGNOSIS — Z00.00 ANNUAL PHYSICAL EXAM: ICD-10-CM

## 2024-05-10 DIAGNOSIS — Z86.32 HISTORY OF GESTATIONAL DIABETES: ICD-10-CM

## 2024-05-10 DIAGNOSIS — Z11.59 NEED FOR HEPATITIS C SCREENING TEST: ICD-10-CM

## 2024-05-10 DIAGNOSIS — F41.1 GENERALIZED ANXIETY DISORDER: ICD-10-CM

## 2024-05-10 DIAGNOSIS — Z11.59 NEED FOR HEPATITIS C SCREENING TEST: Primary | ICD-10-CM

## 2024-05-10 LAB
EST. AVERAGE GLUCOSE BLD GHB EST-MCNC: 94 MG/DL
HBA1C MFR BLD: 4.9 %

## 2024-05-10 PROCEDURE — 83036 HEMOGLOBIN GLYCOSYLATED A1C: CPT

## 2024-05-10 PROCEDURE — 86803 HEPATITIS C AB TEST: CPT

## 2024-05-10 PROCEDURE — 80061 LIPID PANEL: CPT

## 2024-05-10 PROCEDURE — 1036F TOBACCO NON-USER: CPT | Performed by: FAMILY MEDICINE

## 2024-05-10 PROCEDURE — 99395 PREV VISIT EST AGE 18-39: CPT | Performed by: FAMILY MEDICINE

## 2024-05-10 PROCEDURE — 36415 COLL VENOUS BLD VENIPUNCTURE: CPT

## 2024-05-10 RX ORDER — SERTRALINE HYDROCHLORIDE 25 MG/1
25 TABLET, FILM COATED ORAL DAILY
Qty: 90 TABLET | Refills: 3 | Status: SHIPPED | OUTPATIENT
Start: 2024-05-10 | End: 2025-05-10

## 2024-05-10 ASSESSMENT — ENCOUNTER SYMPTOMS
SORE THROAT: 0
PALPITATIONS: 0
CHILLS: 0
COUGH: 0
MUSCULOSKELETAL NEGATIVE: 1
SHORTNESS OF BREATH: 0
WEAKNESS: 0
DIARRHEA: 0
FEVER: 0
NAUSEA: 0
FATIGUE: 0
VOMITING: 0
DIZZINESS: 0
NUMBNESS: 0
PSYCHIATRIC NEGATIVE: 1
HEADACHES: 0
ABDOMINAL PAIN: 0
CONSTIPATION: 0

## 2024-05-10 ASSESSMENT — PATIENT HEALTH QUESTIONNAIRE - PHQ9
7. TROUBLE CONCENTRATING ON THINGS, SUCH AS READING THE NEWSPAPER OR WATCHING TELEVISION: NOT AT ALL
10. IF YOU CHECKED OFF ANY PROBLEMS, HOW DIFFICULT HAVE THESE PROBLEMS MADE IT FOR YOU TO DO YOUR WORK, TAKE CARE OF THINGS AT HOME, OR GET ALONG WITH OTHER PEOPLE: NOT DIFFICULT AT ALL
9. THOUGHTS THAT YOU WOULD BE BETTER OFF DEAD, OR OF HURTING YOURSELF: NOT AT ALL
2. FEELING DOWN, DEPRESSED OR HOPELESS: NOT AT ALL
5. POOR APPETITE OR OVEREATING: NOT AT ALL
6. FEELING BAD ABOUT YOURSELF - OR THAT YOU ARE A FAILURE OR HAVE LET YOURSELF OR YOUR FAMILY DOWN: NOT AT ALL
1. LITTLE INTEREST OR PLEASURE IN DOING THINGS: NOT AT ALL
SUM OF ALL RESPONSES TO PHQ QUESTIONS 1-9: 1
SUM OF ALL RESPONSES TO PHQ9 QUESTIONS 1 AND 2: 0
3. TROUBLE FALLING OR STAYING ASLEEP OR SLEEPING TOO MUCH: SEVERAL DAYS
8. MOVING OR SPEAKING SO SLOWLY THAT OTHER PEOPLE COULD HAVE NOTICED. OR THE OPPOSITE, BEING SO FIGETY OR RESTLESS THAT YOU HAVE BEEN MOVING AROUND A LOT MORE THAN USUAL: NOT AT ALL
4. FEELING TIRED OR HAVING LITTLE ENERGY: NOT AT ALL

## 2024-05-10 ASSESSMENT — ANXIETY QUESTIONNAIRES
2. NOT BEING ABLE TO STOP OR CONTROL WORRYING: NOT AT ALL
5. BEING SO RESTLESS THAT IT IS HARD TO SIT STILL: NOT AT ALL
IF YOU CHECKED OFF ANY PROBLEMS ON THIS QUESTIONNAIRE, HOW DIFFICULT HAVE THESE PROBLEMS MADE IT FOR YOU TO DO YOUR WORK, TAKE CARE OF THINGS AT HOME, OR GET ALONG WITH OTHER PEOPLE: NOT DIFFICULT AT ALL
3. WORRYING TOO MUCH ABOUT DIFFERENT THINGS: NOT AT ALL
1. FEELING NERVOUS, ANXIOUS, OR ON EDGE: SEVERAL DAYS
GAD7 TOTAL SCORE: 2
7. FEELING AFRAID AS IF SOMETHING AWFUL MIGHT HAPPEN: NOT AT ALL
4. TROUBLE RELAXING: NOT AT ALL
6. BECOMING EASILY ANNOYED OR IRRITABLE: SEVERAL DAYS

## 2024-05-10 NOTE — ASSESSMENT & PLAN NOTE
Reduce zoloft to 25 mg daily x 2 weeks  Can then reduce further to 12.5 mg daily x 2 weeks then stop   Also fine to remain on either 25 mg or 12.5 mg daily if so desired

## 2024-05-11 LAB
CHOLEST SERPL-MCNC: 153 MG/DL (ref 0–199)
CHOLESTEROL/HDL RATIO: 2.8
HCV AB SER QL: NONREACTIVE
HDLC SERPL-MCNC: 55.6 MG/DL
LDLC SERPL CALC-MCNC: 81 MG/DL
NON HDL CHOLESTEROL: 97 MG/DL (ref 0–149)
TRIGL SERPL-MCNC: 84 MG/DL (ref 0–149)
VLDL: 17 MG/DL (ref 0–40)

## 2025-01-02 DIAGNOSIS — F41.1 GENERALIZED ANXIETY DISORDER: ICD-10-CM

## 2025-01-02 RX ORDER — SERTRALINE HYDROCHLORIDE 25 MG/1
50 TABLET, FILM COATED ORAL DAILY
Qty: 180 TABLET | Refills: 0 | Status: SHIPPED | OUTPATIENT
Start: 2025-01-02 | End: 2026-01-02

## 2025-01-23 ENCOUNTER — OFFICE VISIT (OUTPATIENT)
Dept: PRIMARY CARE | Facility: CLINIC | Age: 35
End: 2025-01-23
Payer: COMMERCIAL

## 2025-01-23 VITALS
RESPIRATION RATE: 16 BRPM | DIASTOLIC BLOOD PRESSURE: 79 MMHG | OXYGEN SATURATION: 99 % | SYSTOLIC BLOOD PRESSURE: 120 MMHG | TEMPERATURE: 98.2 F | BODY MASS INDEX: 21.28 KG/M2 | WEIGHT: 135.9 LBS | HEART RATE: 80 BPM

## 2025-01-23 DIAGNOSIS — L29.0 ANAL ITCHING: Primary | ICD-10-CM

## 2025-01-23 DIAGNOSIS — L30.9 PERIANAL DERMATITIS: ICD-10-CM

## 2025-01-23 PROCEDURE — 87075 CULTR BACTERIA EXCEPT BLOOD: CPT

## 2025-01-23 PROCEDURE — 99213 OFFICE O/P EST LOW 20 MIN: CPT | Performed by: FAMILY MEDICINE

## 2025-01-23 PROCEDURE — 87077 CULTURE AEROBIC IDENTIFY: CPT

## 2025-01-23 PROCEDURE — 87070 CULTURE OTHR SPECIMN AEROBIC: CPT

## 2025-01-23 PROCEDURE — 1036F TOBACCO NON-USER: CPT | Performed by: FAMILY MEDICINE

## 2025-01-23 RX ORDER — AMOXICILLIN 875 MG/1
875 TABLET, FILM COATED ORAL 2 TIMES DAILY
Qty: 20 TABLET | Refills: 0 | Status: SHIPPED | OUTPATIENT
Start: 2025-01-23 | End: 2025-02-02

## 2025-01-23 NOTE — PROGRESS NOTES
Subjective   Patient ID: Alina Carter is a 35 y.o. female who presents for Anal Itching (Possible anal strep 1/22/25  9:08 AM/Hi Dr. Soni/My daughter Diane was diagnosed with anal strep last Friday and has been on antibiotics since then. I have been experiencing itching and soreness as well in the anal region for the past week or week and a half and it's becoming very annoying. I initially thought it was maybe a hemorrhoid but with her diagnosis I am not so sure. Would you be able to prescribe me an antibiotic for it or do I need to come in to get tested? /Thanks,/Alina Carter/).    HPI     Here today for concern for anal strep infection-   Her daughter was just diagnosed with it and is on antibiotics  She has been having anal itching and soreness for past week   Slight redness around anal opening  No discharge  No vaginal symptoms   No hx hemorrhoids  Has tried preparation H with no change in symptoms   No rectal bleeding       Current Outpatient Medications   Medication Sig Dispense Refill    cholecalciferol (Vitamin D-3) 25 MCG (1000 UT) capsule Take by mouth.      multivitamin-min-iron-FA-vit K (Multi For Her) 18 mg iron-600 mcg-40 mcg capsule Take by mouth.      sertraline (Zoloft) 25 mg tablet Take 2 tablets (50 mg) by mouth once daily. 180 tablet 0    amoxicillin (Amoxil) 875 mg tablet Take 1 tablet (875 mg) by mouth 2 times a day for 10 days. 20 tablet 0     No current facility-administered medications for this visit.       Review of Systems      Scales reviewed            Objective   /79 (BP Location: Left arm, Patient Position: Sitting, BP Cuff Size: Adult)   Pulse 80   Temp 36.8 °C (98.2 °F) (Temporal)   Resp 16   Wt 61.6 kg (135 lb 14.4 oz)   LMP 01/03/2025 (Exact Date)   SpO2 99%   BMI 21.28 kg/m²     Physical Exam    Chaperone declined  Constitutional: Well developed, well nourished, alert and in no acute distress.  Rectal: External examination reveals slight perianal erythema  with few isolated pustules surrounding.  Internal exam deferred.     Assessment/Plan     Problem List Items Addressed This Visit    None  Visit Diagnoses       Anal itching    -  Primary    Relevant Orders    Tissue/Wound Culture/Smear    Perianal dermatitis        Relevant Medications    amoxicillin (Amoxil) 875 mg tablet          Concern for perianal strep dermatitis-  Start oral antibiotics  Culture sent       Cherise Soni,   1/23/2025

## 2025-01-26 LAB
BACTERIA SPEC CULT: ABNORMAL
GRAM STN SPEC: ABNORMAL
GRAM STN SPEC: ABNORMAL

## 2025-01-27 ENCOUNTER — TELEPHONE (OUTPATIENT)
Dept: PRIMARY CARE | Facility: CLINIC | Age: 35
End: 2025-01-27
Payer: COMMERCIAL

## 2025-01-27 LAB
BACTERIA SPEC CULT: ABNORMAL
BACTERIA SPEC CULT: ABNORMAL
GRAM STN SPEC: ABNORMAL
GRAM STN SPEC: ABNORMAL

## 2025-01-27 NOTE — TELEPHONE ENCOUNTER
Please notify patient that culture is positive for group A strep    Finish antibiotics as prescribed

## 2025-03-30 DIAGNOSIS — F41.1 GENERALIZED ANXIETY DISORDER: ICD-10-CM

## 2025-03-31 RX ORDER — SERTRALINE HYDROCHLORIDE 25 MG/1
TABLET, FILM COATED ORAL
Qty: 180 TABLET | Refills: 0 | Status: SHIPPED | OUTPATIENT
Start: 2025-03-31

## 2025-04-06 ASSESSMENT — ENCOUNTER SYMPTOMS
DYSURIA: 0
VOMITING: 0
FEVER: 0
HEMATOCHEZIA: 0
FREQUENCY: 0
NAUSEA: 0
FLATUS: 1
HEMATURIA: 0
ABDOMINAL PAIN: 1
ARTHRALGIAS: 1
BELCHING: 0
CONSTIPATION: 1
DIARRHEA: 0
ANOREXIA: 0
HEADACHES: 0
MYALGIAS: 0
WEIGHT LOSS: 0

## 2025-04-06 NOTE — PROGRESS NOTES
Subjective   Patient ID: Alina Carter is a 35 y.o. female who presents for No chief complaint on file..    HPI     Here today for   She has a hx of intermittent low back pain since 2022   Xray 2023 Mild anterolisthesis of L5 on S1 with associated spondylolysis at L5 bilaterally     Current Outpatient Medications   Medication Sig Dispense Refill    cholecalciferol (Vitamin D-3) 25 MCG (1000 UT) capsule Take by mouth.      multivitamin-min-iron-FA-vit K (Multi For Her) 18 mg iron-600 mcg-40 mcg capsule Take by mouth.      sertraline (Zoloft) 25 mg tablet TAKE 2 TABLETS (50MG) BY MOUTH ONCE DAILY. 180 tablet 0     No current facility-administered medications for this visit.       Review of Systems      Scales reviewed            Objective   There were no vitals taken for this visit.    Physical Exam    Assessment/Plan     Problem List Items Addressed This Visit    None      Follow up in *** months.      Cherise Soni,   4/6/2025     Constitutional:  Negative for chills, fever and weight loss.   Gastrointestinal:  Positive for abdominal pain (right pelvic), constipation and flatus. Negative for anorexia, blood in stool, diarrhea, hematochezia, melena, nausea and vomiting.   Genitourinary:  Negative for dysuria, frequency, hematuria and urgency.   Musculoskeletal:  Positive for arthralgias and back pain. Negative for gait problem, joint swelling and myalgias.   Skin:  Negative for color change and rash.   Neurological:  Negative for weakness, numbness and headaches.         Scales reviewed    Patient Health Questionnaire-2 Score: 0 (04/07/25 1405)       Objective   /67 (BP Location: Left arm, Patient Position: Sitting, BP Cuff Size: Adult)   Pulse 85   Temp 37.2 °C (99 °F) (Temporal)   Wt 60.5 kg (133 lb 4.8 oz)   LMP 03/19/2025   SpO2 99%   BMI 20.88 kg/m²     Physical Exam    Constitutional: Well developed, well nourished, alert and in no acute distress   Eyes: Normal external exam.   Cardiovascular: Regular rate and rhythm, normal S1 and S2, no murmurs, gallops, or rubs.   Pulmonary: No respiratory distress, lungs clear to auscultation bilaterally. No wheezes, rhonchi, rales.  Abdomen: Soft, nondistended, +BS. Mildly TTP medial to right anterior hip and also lateral hip.    Back: No spinal TTP. Normal ROM.   Hip: Normal ROM .   Skin: Warm, well perfused, normal skin turgor and color.   Neurologic: Cranial nerves II-XII grossly intact.   Psychiatric: Mood calm and affect normal.    Assessment/Plan     Problem List Items Addressed This Visit    None  Visit Diagnoses       Pelvic pain    -  Primary    Relevant Orders    US pelvis transvaginal    Pain of right hip        Relevant Orders    XR hip right with pelvis when performed 2 or 3 views    Hypermobility of joint        Relevant Orders    Referral to Genetics          We will start with pelvic U/S to evaluate ovaries and obtain a x-ray of the right hip and pelvis  Refer genetics  for hypermobility   Ibuprofen as needed in interim- max daily dose 800 mg TID     Cherise Soni, DO  4/7/2025

## 2025-04-07 ENCOUNTER — APPOINTMENT (OUTPATIENT)
Dept: PRIMARY CARE | Facility: CLINIC | Age: 35
End: 2025-04-07
Payer: COMMERCIAL

## 2025-04-07 VITALS
HEART RATE: 85 BPM | OXYGEN SATURATION: 99 % | DIASTOLIC BLOOD PRESSURE: 67 MMHG | BODY MASS INDEX: 20.88 KG/M2 | TEMPERATURE: 99 F | SYSTOLIC BLOOD PRESSURE: 103 MMHG | WEIGHT: 133.3 LBS

## 2025-04-07 DIAGNOSIS — M24.9 HYPERMOBILITY OF JOINT: ICD-10-CM

## 2025-04-07 DIAGNOSIS — R10.2 PELVIC PAIN: Primary | ICD-10-CM

## 2025-04-07 DIAGNOSIS — M25.551 PAIN OF RIGHT HIP: ICD-10-CM

## 2025-04-07 PROCEDURE — 1036F TOBACCO NON-USER: CPT | Performed by: FAMILY MEDICINE

## 2025-04-07 PROCEDURE — 99213 OFFICE O/P EST LOW 20 MIN: CPT | Performed by: FAMILY MEDICINE

## 2025-04-07 ASSESSMENT — ENCOUNTER SYMPTOMS
NUMBNESS: 0
MYALGIAS: 0
DYSURIA: 0
ARTHRALGIAS: 1
CONSTIPATION: 1
JOINT SWELLING: 0
HEMATURIA: 0
ABDOMINAL PAIN: 1
BACK PAIN: 1
DIARRHEA: 0
WEAKNESS: 0
HEMATOCHEZIA: 0
FREQUENCY: 0
HEADACHES: 0
NAUSEA: 0
VOMITING: 0
FLATUS: 1
CHILLS: 0
BELCHING: 0
COLOR CHANGE: 0
ANOREXIA: 0
BLOOD IN STOOL: 0
FEVER: 0
WEIGHT LOSS: 0

## 2025-04-07 ASSESSMENT — PAIN SCALES - GENERAL: PAINLEVEL_OUTOF10: 5

## 2025-04-09 ENCOUNTER — HOSPITAL ENCOUNTER (OUTPATIENT)
Dept: RADIOLOGY | Facility: CLINIC | Age: 35
Discharge: HOME | End: 2025-04-09
Payer: COMMERCIAL

## 2025-04-09 DIAGNOSIS — M25.551 PAIN OF RIGHT HIP: ICD-10-CM

## 2025-04-09 DIAGNOSIS — R10.2 PELVIC PAIN: ICD-10-CM

## 2025-04-09 PROCEDURE — 73502 X-RAY EXAM HIP UNI 2-3 VIEWS: CPT | Mod: RT

## 2025-04-09 PROCEDURE — 73502 X-RAY EXAM HIP UNI 2-3 VIEWS: CPT | Mod: RIGHT SIDE | Performed by: STUDENT IN AN ORGANIZED HEALTH CARE EDUCATION/TRAINING PROGRAM

## 2025-04-09 PROCEDURE — 76830 TRANSVAGINAL US NON-OB: CPT | Performed by: RADIOLOGY

## 2025-04-09 PROCEDURE — 76830 TRANSVAGINAL US NON-OB: CPT

## 2025-04-09 PROCEDURE — 76856 US EXAM PELVIC COMPLETE: CPT | Performed by: RADIOLOGY

## 2025-04-22 DIAGNOSIS — M54.41 CHRONIC RIGHT-SIDED LOW BACK PAIN WITH RIGHT-SIDED SCIATICA: Primary | ICD-10-CM

## 2025-04-22 DIAGNOSIS — G89.29 CHRONIC RIGHT-SIDED LOW BACK PAIN WITH RIGHT-SIDED SCIATICA: Primary | ICD-10-CM

## 2025-04-25 ENCOUNTER — HOSPITAL ENCOUNTER (OUTPATIENT)
Dept: RADIOLOGY | Facility: CLINIC | Age: 35
Discharge: HOME | End: 2025-04-25
Payer: COMMERCIAL

## 2025-04-25 DIAGNOSIS — M54.41 CHRONIC RIGHT-SIDED LOW BACK PAIN WITH RIGHT-SIDED SCIATICA: ICD-10-CM

## 2025-04-25 DIAGNOSIS — G89.29 CHRONIC RIGHT-SIDED LOW BACK PAIN WITH RIGHT-SIDED SCIATICA: ICD-10-CM

## 2025-04-25 PROCEDURE — 72110 X-RAY EXAM L-2 SPINE 4/>VWS: CPT

## 2025-04-29 DIAGNOSIS — M43.07 SPONDYLOLYSIS OF LUMBOSACRAL REGION: Primary | ICD-10-CM

## 2025-05-08 ENCOUNTER — APPOINTMENT (OUTPATIENT)
Dept: NEUROSURGERY | Facility: CLINIC | Age: 35
End: 2025-05-08
Payer: COMMERCIAL

## 2025-05-08 VITALS
SYSTOLIC BLOOD PRESSURE: 116 MMHG | HEART RATE: 77 BPM | HEIGHT: 67 IN | BODY MASS INDEX: 20.4 KG/M2 | DIASTOLIC BLOOD PRESSURE: 72 MMHG | TEMPERATURE: 97.5 F | WEIGHT: 130 LBS

## 2025-05-08 DIAGNOSIS — M54.16 LUMBAR RADICULOPATHY: Primary | ICD-10-CM

## 2025-05-08 DIAGNOSIS — M43.07 SPONDYLOLYSIS OF LUMBOSACRAL REGION: ICD-10-CM

## 2025-05-08 PROCEDURE — 1036F TOBACCO NON-USER: CPT | Performed by: NURSE PRACTITIONER

## 2025-05-08 PROCEDURE — 99204 OFFICE O/P NEW MOD 45 MIN: CPT | Performed by: NURSE PRACTITIONER

## 2025-05-08 PROCEDURE — 3008F BODY MASS INDEX DOCD: CPT | Performed by: NURSE PRACTITIONER

## 2025-05-08 ASSESSMENT — PAIN SCALES - GENERAL: PAINLEVEL_OUTOF10: 6

## 2025-05-08 ASSESSMENT — ENCOUNTER SYMPTOMS: OCCASIONAL FEELINGS OF UNSTEADINESS: 0

## 2025-05-08 ASSESSMENT — PATIENT HEALTH QUESTIONNAIRE - PHQ9
2. FEELING DOWN, DEPRESSED OR HOPELESS: NOT AT ALL
1. LITTLE INTEREST OR PLEASURE IN DOING THINGS: NOT AT ALL
SUM OF ALL RESPONSES TO PHQ9 QUESTIONS 1 AND 2: 0

## 2025-05-08 NOTE — PROGRESS NOTES
[facility] [place]  Department of Neurological Surgery  New Patient Visit    History of Present Illness:     Alina Carter is a 35 y.o. year old female who presents to the spine clinic with chronic low back pain.  Patient states that she started experiencing the back pain approximately 3 years ago and it is progressively worsened.  She discussed this with her PCP who ordered a lumbar x-ray and referral to see me.  She states the pain is persistent and occasionally radiates down her posterior thigh however more frequently is in her anterior thigh.  She states that movement frequently exacerbates that and it alleviates somewhat with rest.  She has tried physical therapy approximately 2 years ago and received some relief therefore has continued to do her home exercise program.  She is taking over-the-counter pain relievers that just dulled the pain but not fully alleviated.      Chief Complaint   Patient presents with    New Patient Visit     Patient is complaining of low back pain bilaterally,  The pain is described as a constant aching.      Prior Spine Surgeries: none    Physical Therapy: In person 2 years ago and has continued home exercise program 2 to 3 days a week 15 to 20 minutes per session with exercises that include low back rotational stretches, shoulder blade squeezes and pelvic tilts.    Diabetic: no   Lab Results   Component Value Date    HGBA1C 4.9 05/10/2024        Osteoporosis: no      Patient's BMI is Body mass index is 20.36 kg/m².    Smoking History: never a smoker    14/14 systems reviewed and negative other than what is listed in the history of present illness  Problem List[1]  Medical History[2]  Surgical History[3]  Social History     Tobacco Use    Smoking status: Never     Passive exposure: Past    Smokeless tobacco: Never   Substance Use Topics    Alcohol use: Not Currently     family history includes Alcohol abuse in an other family member; Arthritis in her maternal  grandfather; COPD in her paternal grandmother; Cancer in her maternal grandfather and paternal grandfather; Cardiac disorder in an other family member; Diabetes in her maternal grandfather, paternal grandfather, and another family member; Hypertension in her father; No Known Problems in her mother.  Current Medications[4]  Allergies[5]    Physical Examination:     General: Well developed, awake/alert/oriented x3, no distress, alert and cooperative  Skin: Warm and dry, no lesions, no rashes  ENMT: Mucous membranes moist, no apparent injury, no lesions seen  Head/Neck: Neck Supple, no apparent injury  Respiratory/Thorax: Normal breath sounds with good chest expansion, thorax symmetric  Cardiovascular: No pitting edema, no JVD    Motor Strength: 5/5 Throughout bilateral upper extremities and bilateral lower extremities    Muscle Bulk: Normal and symmetric in all extremities     Paraspinal muscle spasm/tenderness present R>L at L5-S1    Midline tenderness present L5-S1    Sensation to light touch intact    Results:     I personally reviewed and interpreted the imaging results which included lumbar x-ray from 04/28/25 that demonstrated a grade 1 L5 anterolisthesis    Assessment and Plan:     Alina Carter is a 35 y.o. year old female who presents to the spine clinic with chronic low back pain.  Patient states that she started experiencing the back pain approximately 3 years ago and it is progressively worsened.  She discussed this with her PCP who ordered a lumbar x-ray and referral to see me.  She states the pain is persistent and occasionally radiates down her posterior thigh however more frequently is in her anterior thigh.  She states that movement frequently exacerbates that and it alleviates somewhat with rest.  She has tried physical therapy approximately 2 years ago and received some relief therefore has continued to do her home exercise program.  She is taking over-the-counter pain relievers that just dulled  the pain but not fully alleviated.    On physical exam the patient is alert and oriented x 3.  She has no focal deficits noted on neurologic exam.  She has 5/5 strength in all 4 extremities.  She has midline tenderness from L5-S1 that radiates more to the right than the left bilateral paraspinal musculature.  She denies any loss of bowel or bladder control or saddle anesthesia.  I personally reviewed her lumbar x-ray from 04/28/2025 that demonstrated a grade 1 L5 anterolisthesis.    Since her complaint of pain also radiates into her anterior thighs I would like to get an MRI to better assess her soft tissue and nerve involvement.  I am also referring her back to physical therapy to get an improved home exercise program as well as to help with core strengthening.  I discussed with her that would like to exhaust all nonsurgical options prior to proceeding with any surgical opinions.  She verbalized understanding and agrees with this plan of care.  I will contact the patient with results of her imaging.  I reviewed red flag symptoms with the patient as well as how and when to seek emergency medical care.  She will contact the office in the meantime with any questions or concerns.        Samantha Meeson, MSN, NP-C  Adult-Gerontology Associate Nurse Practitioner  Department of Neurosurgery- [facility] [place]  Main phone [phone]  Fax [phone]         [1]   Patient Active Problem List  Diagnosis    Generalized anxiety disorder    History of gestational diabetes    Chronic right-sided low back pain with right-sided sciatica    Hyperreflexic    Spondylolysis of lumbosacral region    Anterolisthesis of lumbosacral spine   [2]   Past Medical History:  Diagnosis Date    Abscess of Bartholin's gland 03/15/2023    Allergic contact dermatitis due to plants, except food 08/13/2017    Contact dermatitis due to poison ivy    Anxiety 2014    Anxiety disorder, unspecified     Anxiety    GERD (gastroesophageal reflux disease)  2023    Nontoxic multinodular goiter 09/11/2020    Multiple thyroid nodules    Other specified health status     No pertinent past surgical history    Other specified personal risk factors, not elsewhere classified     Relies on partner vasectomy for contraception    Personal history of other specified conditions     History of headache    Personal history of other specified conditions 10/21/2020    History of gross hematuria    Relies on partner vasectomy for contraception     Screening for cervical cancer 02/05/2016 2/5/16 NILM   [3]   Past Surgical History:  Procedure Laterality Date    BARTHOLIN GLAND CYST EXCISION Right    [4]   Current Outpatient Medications:     cholecalciferol (Vitamin D-3) 25 MCG (1000 UT) capsule, Take by mouth., Disp: , Rfl:     multivitamin-min-iron-FA-vit K (Multi For Her) 18 mg iron-600 mcg-40 mcg capsule, Take by mouth., Disp: , Rfl:     sertraline (Zoloft) 25 mg tablet, TAKE 2 TABLETS (50MG) BY MOUTH ONCE DAILY., Disp: 180 tablet, Rfl: 0  [5]   Allergies  Allergen Reactions    Nickel Other

## 2025-05-12 ASSESSMENT — PROMIS GLOBAL HEALTH SCALE
EMOTIONAL_PROBLEMS: SOMETIMES
RATE_AVERAGE_FATIGUE: MILD
CARRYOUT_SOCIAL_ACTIVITIES: VERY GOOD
RATE_GENERAL_HEALTH: GOOD
RATE_QUALITY_OF_LIFE: VERY GOOD
RATE_SOCIAL_SATISFACTION: VERY GOOD
RATE_AVERAGE_PAIN: 5
RATE_MENTAL_HEALTH: FAIR
CARRYOUT_PHYSICAL_ACTIVITIES: MOSTLY
RATE_PHYSICAL_HEALTH: FAIR

## 2025-05-13 ENCOUNTER — APPOINTMENT (OUTPATIENT)
Dept: PRIMARY CARE | Facility: CLINIC | Age: 35
End: 2025-05-13
Payer: COMMERCIAL

## 2025-05-15 NOTE — PROGRESS NOTES
"Subjective   Patient ID: Alina Carter is a 35 y.o. female who presents for Annual Exam (Pt presents for CPE. Pt states requesting blood work.).    HPI     The patient presents for her annual wellness exam. GYN- Sandra   Last pap/cervical cancer screening: believes current- scheduled again in July   LMP/menstrual cycles: monthly   Contraception:  vasectomy   History of depression or anxiety: yes- on zoloft 50 mg- working well for the most part.  Did some counseling last year.    Immunizations: utd   Diet: Follows a healthy diet  Exercise: Gets regular exercise - weights, yoga, walks dog      Derm- [facility]     Persistent L5 anterolisthesis measuring 9 mm and minimal disc space narrowing at L5-S1- referred to ortho and PT   She saw Samantha Meeson NP   Doing low back exercises at home now   Has MRI pending - scheduled next Friday       Current Medications[1]    Review of Systems   Constitutional:  Negative for chills, fatigue and fever.   HENT:  Negative for congestion, ear pain and sore throat.    Eyes:  Negative for visual disturbance.   Respiratory:  Negative for cough and shortness of breath.    Cardiovascular:  Negative for chest pain, palpitations and leg swelling.   Gastrointestinal:  Negative for abdominal pain, constipation, diarrhea, nausea and vomiting.   Genitourinary: Negative.    Musculoskeletal: Negative.    Skin:  Negative for rash.   Neurological:  Negative for dizziness, weakness, numbness and headaches.   Psychiatric/Behavioral: Negative.           Scales reviewed    BON-7 Total Score: 6 (05/16/25 1259)  Patient Health Questionnaire-9 Score: 1 (05/16/25 1257)  Patient Health Questionnaire-2 Score: 0 (05/16/25 1257)       Objective   /65 (BP Location: Left arm, Patient Position: Sitting, BP Cuff Size: Adult)   Pulse 69   Temp 36.7 °C (98 °F) (Temporal)   Resp 12   Ht 1.727 m (5' 8\")   Wt 60.1 kg (132 lb 9.6 oz)   LMP 05/11/2025 (Exact Date)   SpO2 98%   BMI 20.16 kg/m² "     Physical Exam    Constitutional: Well developed, well nourished, alert and in no acute distress.  Head and Face: Normocephalic, atraumatic.  Eyes: Normal external exam. Pupils equally round and reactive to light with normal accommodation and extraocular movements intact.   ENT: External inspection of ears normal, tympanic membranes visualized and normal. Nasal mucosa, septum, and turbinates normal. Oral mucosa moist, oropharynx clear.   Neck: Supple, no lymphadenopathy or masses. Thyroid not enlarged, no palpable nodules.   Cardiovascular: Regular rate and rhythm, normal S1 and S2, no murmurs, gallops, or rubs. Radial pulses normal. No peripheral edema. No carotid bruits.   Pulmonary: No respiratory distress, lungs clear to auscultation bilaterally. No wheezes, rhonchi, rales.   Abdomen: Soft, nontender, nondistended, normal bowel sounds. No masses palpated.   Musculoskeletal: Gait normal. Muscle strength/tone normal of all 4 extremities. Normal range of motion of all extremities.   Skin: Warm, well perfused, normal skin turgor and color, no lesions or rashes noted.   Neurologic: Cranial nerves II-XII grossly intact. Sensation normal bilaterally.   Psychiatric: Mood calm and affect normal.    Assessment/Plan     Problem List Items Addressed This Visit       Generalized anxiety disorder    Relevant Medications    sertraline (Zoloft) 50 mg tablet    History of gestational diabetes    Relevant Orders    Hemoglobin A1C     Other Visit Diagnoses         Annual physical exam    -  Primary    Relevant Orders    CBC and Auto Differential    Comprehensive Metabolic Panel    Lipid Panel    TSH with reflex to Free T4 if abnormal    Vitamin D 25-Hydroxy,Total (for eval of Vitamin D levels)            Follow up in 12 months.      Cherise Soni DO  5/16/2025         [1]   Current Outpatient Medications   Medication Sig Dispense Refill    cholecalciferol (Vitamin D-3) 25 MCG (1000 UT) capsule Take by mouth.       multivitamin-min-iron-FA-vit K (Multi For Her) 18 mg iron-600 mcg-40 mcg capsule Take by mouth.      sertraline (Zoloft) 50 mg tablet Take 1 tablet (50 mg) by mouth once daily. 90 tablet 3     No current facility-administered medications for this visit.

## 2025-05-16 ENCOUNTER — OFFICE VISIT (OUTPATIENT)
Dept: PRIMARY CARE | Facility: CLINIC | Age: 35
End: 2025-05-16
Payer: COMMERCIAL

## 2025-05-16 VITALS
DIASTOLIC BLOOD PRESSURE: 65 MMHG | RESPIRATION RATE: 12 BRPM | OXYGEN SATURATION: 98 % | HEIGHT: 68 IN | WEIGHT: 132.6 LBS | SYSTOLIC BLOOD PRESSURE: 100 MMHG | HEART RATE: 69 BPM | TEMPERATURE: 98 F | BODY MASS INDEX: 20.1 KG/M2

## 2025-05-16 DIAGNOSIS — Z00.00 ANNUAL PHYSICAL EXAM: Primary | ICD-10-CM

## 2025-05-16 DIAGNOSIS — Z86.32 HISTORY OF GESTATIONAL DIABETES: ICD-10-CM

## 2025-05-16 DIAGNOSIS — F41.1 GENERALIZED ANXIETY DISORDER: ICD-10-CM

## 2025-05-16 PROBLEM — R29.2 HYPERREFLEXIC: Status: RESOLVED | Noted: 2023-07-26 | Resolved: 2025-05-16

## 2025-05-16 PROCEDURE — 99395 PREV VISIT EST AGE 18-39: CPT | Performed by: FAMILY MEDICINE

## 2025-05-16 PROCEDURE — 1036F TOBACCO NON-USER: CPT | Performed by: FAMILY MEDICINE

## 2025-05-16 PROCEDURE — 3008F BODY MASS INDEX DOCD: CPT | Performed by: FAMILY MEDICINE

## 2025-05-16 RX ORDER — SERTRALINE HYDROCHLORIDE 50 MG/1
50 TABLET, FILM COATED ORAL DAILY
Qty: 90 TABLET | Refills: 3 | Status: SHIPPED | OUTPATIENT
Start: 2025-05-16

## 2025-05-16 ASSESSMENT — PATIENT HEALTH QUESTIONNAIRE - PHQ9
SUM OF ALL RESPONSES TO PHQ9 QUESTIONS 1 AND 2: 0
8. MOVING OR SPEAKING SO SLOWLY THAT OTHER PEOPLE COULD HAVE NOTICED. OR THE OPPOSITE, BEING SO FIGETY OR RESTLESS THAT YOU HAVE BEEN MOVING AROUND A LOT MORE THAN USUAL: NOT AT ALL
5. POOR APPETITE OR OVEREATING: NOT AT ALL
9. THOUGHTS THAT YOU WOULD BE BETTER OFF DEAD, OR OF HURTING YOURSELF: NOT AT ALL
10. IF YOU CHECKED OFF ANY PROBLEMS, HOW DIFFICULT HAVE THESE PROBLEMS MADE IT FOR YOU TO DO YOUR WORK, TAKE CARE OF THINGS AT HOME, OR GET ALONG WITH OTHER PEOPLE: NOT DIFFICULT AT ALL
4. FEELING TIRED OR HAVING LITTLE ENERGY: SEVERAL DAYS
1. LITTLE INTEREST OR PLEASURE IN DOING THINGS: NOT AT ALL
6. FEELING BAD ABOUT YOURSELF - OR THAT YOU ARE A FAILURE OR HAVE LET YOURSELF OR YOUR FAMILY DOWN: NOT AT ALL
7. TROUBLE CONCENTRATING ON THINGS, SUCH AS READING THE NEWSPAPER OR WATCHING TELEVISION: NOT AT ALL
2. FEELING DOWN, DEPRESSED OR HOPELESS: NOT AT ALL
3. TROUBLE FALLING OR STAYING ASLEEP OR SLEEPING TOO MUCH: NOT AT ALL
SUM OF ALL RESPONSES TO PHQ QUESTIONS 1-9: 1

## 2025-05-16 ASSESSMENT — ENCOUNTER SYMPTOMS
MUSCULOSKELETAL NEGATIVE: 1
HEADACHES: 0
WEAKNESS: 0
SORE THROAT: 0
NAUSEA: 0
PSYCHIATRIC NEGATIVE: 1
CHILLS: 0
FATIGUE: 0
DIZZINESS: 0
CONSTIPATION: 0
PALPITATIONS: 0
FEVER: 0
ABDOMINAL PAIN: 0
COUGH: 0
SHORTNESS OF BREATH: 0
VOMITING: 0
NUMBNESS: 0
DIARRHEA: 0

## 2025-05-16 ASSESSMENT — PROMIS GLOBAL HEALTH SCALE
RATE_AVERAGE_PAIN: 5
RATE_PHYSICAL_HEALTH: FAIR
RATE_AVERAGE_FATIGUE: MILD
RATE_SOCIAL_SATISFACTION: VERY GOOD
RATE_QUALITY_OF_LIFE: VERY GOOD
RATE_MENTAL_HEALTH: FAIR
CARRYOUT_PHYSICAL_ACTIVITIES: MOSTLY
EMOTIONAL_PROBLEMS: SOMETIMES
RATE_GENERAL_HEALTH: GOOD
CARRYOUT_SOCIAL_ACTIVITIES: VERY GOOD

## 2025-05-16 ASSESSMENT — ANXIETY QUESTIONNAIRES
2. NOT BEING ABLE TO STOP OR CONTROL WORRYING: SEVERAL DAYS
6. BECOMING EASILY ANNOYED OR IRRITABLE: SEVERAL DAYS
5. BEING SO RESTLESS THAT IT IS HARD TO SIT STILL: SEVERAL DAYS
7. FEELING AFRAID AS IF SOMETHING AWFUL MIGHT HAPPEN: NOT AT ALL
3. WORRYING TOO MUCH ABOUT DIFFERENT THINGS: SEVERAL DAYS
4. TROUBLE RELAXING: SEVERAL DAYS
GAD7 TOTAL SCORE: 6
IF YOU CHECKED OFF ANY PROBLEMS ON THIS QUESTIONNAIRE, HOW DIFFICULT HAVE THESE PROBLEMS MADE IT FOR YOU TO DO YOUR WORK, TAKE CARE OF THINGS AT HOME, OR GET ALONG WITH OTHER PEOPLE: NOT DIFFICULT AT ALL
1. FEELING NERVOUS, ANXIOUS, OR ON EDGE: SEVERAL DAYS

## 2025-05-20 DIAGNOSIS — E55.9 VITAMIN D DEFICIENCY: Primary | ICD-10-CM

## 2025-05-20 LAB
25(OH)D3+25(OH)D2 SERPL-MCNC: 22 NG/ML (ref 30–100)
ALBUMIN SERPL-MCNC: 4.6 G/DL (ref 3.6–5.1)
ALP SERPL-CCNC: 46 U/L (ref 31–125)
ALT SERPL-CCNC: 12 U/L (ref 6–29)
ANION GAP SERPL CALCULATED.4IONS-SCNC: 9 MMOL/L (CALC) (ref 7–17)
AST SERPL-CCNC: 15 U/L (ref 10–30)
BASOPHILS # BLD AUTO: 39 CELLS/UL (ref 0–200)
BASOPHILS NFR BLD AUTO: 0.7 %
BILIRUB SERPL-MCNC: 0.6 MG/DL (ref 0.2–1.2)
BUN SERPL-MCNC: 11 MG/DL (ref 7–25)
CALCIUM SERPL-MCNC: 8.7 MG/DL (ref 8.6–10.2)
CHLORIDE SERPL-SCNC: 103 MMOL/L (ref 98–110)
CHOLEST SERPL-MCNC: 157 MG/DL
CHOLEST/HDLC SERPL: 2.8 (CALC)
CO2 SERPL-SCNC: 26 MMOL/L (ref 20–32)
CREAT SERPL-MCNC: 0.76 MG/DL (ref 0.5–0.97)
EGFRCR SERPLBLD CKD-EPI 2021: 106 ML/MIN/1.73M2
EOSINOPHIL # BLD AUTO: 132 CELLS/UL (ref 15–500)
EOSINOPHIL NFR BLD AUTO: 2.4 %
ERYTHROCYTE [DISTWIDTH] IN BLOOD BY AUTOMATED COUNT: 12 % (ref 11–15)
EST. AVERAGE GLUCOSE BLD GHB EST-MCNC: 108 MG/DL
EST. AVERAGE GLUCOSE BLD GHB EST-SCNC: 6 MMOL/L
GLUCOSE SERPL-MCNC: 87 MG/DL (ref 65–99)
HBA1C MFR BLD: 5.4 %
HCT VFR BLD AUTO: 43.2 % (ref 35–45)
HDLC SERPL-MCNC: 56 MG/DL
HGB BLD-MCNC: 14.1 G/DL (ref 11.7–15.5)
LDLC SERPL CALC-MCNC: 83 MG/DL (CALC)
LYMPHOCYTES # BLD AUTO: 1419 CELLS/UL (ref 850–3900)
LYMPHOCYTES NFR BLD AUTO: 25.8 %
MCH RBC QN AUTO: 29.5 PG (ref 27–33)
MCHC RBC AUTO-ENTMCNC: 32.6 G/DL (ref 32–36)
MCV RBC AUTO: 90.4 FL (ref 80–100)
MONOCYTES # BLD AUTO: 363 CELLS/UL (ref 200–950)
MONOCYTES NFR BLD AUTO: 6.6 %
NEUTROPHILS # BLD AUTO: 3548 CELLS/UL (ref 1500–7800)
NEUTROPHILS NFR BLD AUTO: 64.5 %
NONHDLC SERPL-MCNC: 101 MG/DL (CALC)
PLATELET # BLD AUTO: 183 THOUSAND/UL (ref 140–400)
PMV BLD REES-ECKER: 11.6 FL (ref 7.5–12.5)
POTASSIUM SERPL-SCNC: 3.6 MMOL/L (ref 3.5–5.3)
PROT SERPL-MCNC: 7.3 G/DL (ref 6.1–8.1)
RBC # BLD AUTO: 4.78 MILLION/UL (ref 3.8–5.1)
SODIUM SERPL-SCNC: 138 MMOL/L (ref 135–146)
TRIGL SERPL-MCNC: 88 MG/DL
TSH SERPL-ACNC: 0.98 MIU/L
WBC # BLD AUTO: 5.5 THOUSAND/UL (ref 3.8–10.8)

## 2025-05-20 RX ORDER — ERGOCALCIFEROL 1.25 MG/1
1.25 CAPSULE ORAL WEEKLY
Qty: 12 CAPSULE | Refills: 0 | Status: SHIPPED | OUTPATIENT
Start: 2025-05-20 | End: 2025-08-12

## 2025-05-23 ENCOUNTER — APPOINTMENT (OUTPATIENT)
Dept: RADIOLOGY | Facility: CLINIC | Age: 35
End: 2025-05-23
Payer: COMMERCIAL

## 2025-06-13 ENCOUNTER — APPOINTMENT (OUTPATIENT)
Dept: RADIOLOGY | Facility: CLINIC | Age: 35
End: 2025-06-13
Payer: COMMERCIAL

## 2025-06-13 DIAGNOSIS — M54.16 LUMBAR RADICULOPATHY: ICD-10-CM

## 2025-06-13 DIAGNOSIS — M43.07 SPONDYLOLYSIS OF LUMBOSACRAL REGION: ICD-10-CM

## 2025-06-13 PROCEDURE — 72148 MRI LUMBAR SPINE W/O DYE: CPT

## 2025-06-19 ENCOUNTER — OFFICE VISIT (OUTPATIENT)
Dept: GENETICS | Facility: HOSPITAL | Age: 35
End: 2025-06-19
Payer: COMMERCIAL

## 2025-06-19 VITALS
TEMPERATURE: 98.2 F | DIASTOLIC BLOOD PRESSURE: 77 MMHG | SYSTOLIC BLOOD PRESSURE: 120 MMHG | WEIGHT: 131.84 LBS | BODY MASS INDEX: 19.53 KG/M2 | HEART RATE: 96 BPM | HEIGHT: 69 IN

## 2025-06-19 DIAGNOSIS — Z81.8 FAMILY HISTORY OF ATTENTION DEFICIT HYPERACTIVITY DISORDER (ADHD): ICD-10-CM

## 2025-06-19 DIAGNOSIS — Z81.8 FAMILY HISTORY OF AUTISM: ICD-10-CM

## 2025-06-19 DIAGNOSIS — Z82.49 FAMILY HISTORY OF CARDIAC DISORDER: ICD-10-CM

## 2025-06-19 DIAGNOSIS — M25.40 JOINT SWELLING: ICD-10-CM

## 2025-06-19 DIAGNOSIS — Z81.8 FAMILY HISTORY OF BIPOLAR DISORDER: ICD-10-CM

## 2025-06-19 DIAGNOSIS — Z83.42 FAMILY HISTORY OF HIGH CHOLESTEROL: ICD-10-CM

## 2025-06-19 DIAGNOSIS — Z82.61 FAMILY HX-ARTHRITIS: ICD-10-CM

## 2025-06-19 DIAGNOSIS — G89.29 CHRONIC JOINT PAIN: ICD-10-CM

## 2025-06-19 DIAGNOSIS — M25.60 JOINT STIFFNESS: ICD-10-CM

## 2025-06-19 DIAGNOSIS — Z81.8 FAMILY HISTORY OF ANXIETY DISORDER: ICD-10-CM

## 2025-06-19 DIAGNOSIS — M25.50 CHRONIC JOINT PAIN: ICD-10-CM

## 2025-06-19 DIAGNOSIS — Z82.5 FAMILY HISTORY OF COPD (CHRONIC OBSTRUCTIVE PULMONARY DISEASE): ICD-10-CM

## 2025-06-19 DIAGNOSIS — Z81.1 FAMILY HISTORY OF ALCOHOL ABUSE: ICD-10-CM

## 2025-06-19 DIAGNOSIS — Z80.9 FAMILY HX-MALIGNANCY: ICD-10-CM

## 2025-06-19 DIAGNOSIS — Z83.49 FAMILY HISTORY OF THYROID DISEASE: ICD-10-CM

## 2025-06-19 DIAGNOSIS — M24.9 HYPERMOBILITY OF JOINT: Primary | ICD-10-CM

## 2025-06-19 DIAGNOSIS — Z82.49 FAMILY HISTORY OF HIGH BLOOD PRESSURE: ICD-10-CM

## 2025-06-19 DIAGNOSIS — Z13.79 GENETIC SCREENING: ICD-10-CM

## 2025-06-19 DIAGNOSIS — Z82.69 FAMILY HISTORY OF ANKYLOSING SPONDYLITIS: ICD-10-CM

## 2025-06-19 DIAGNOSIS — Z82.3 FAMILY HX-STROKE: ICD-10-CM

## 2025-06-19 PROCEDURE — 99205 OFFICE O/P NEW HI 60 MIN: CPT | Performed by: STUDENT IN AN ORGANIZED HEALTH CARE EDUCATION/TRAINING PROGRAM

## 2025-06-19 PROCEDURE — 3008F BODY MASS INDEX DOCD: CPT | Performed by: STUDENT IN AN ORGANIZED HEALTH CARE EDUCATION/TRAINING PROGRAM

## 2025-06-19 PROCEDURE — 99417 PROLNG OP E/M EACH 15 MIN: CPT | Performed by: STUDENT IN AN ORGANIZED HEALTH CARE EDUCATION/TRAINING PROGRAM

## 2025-06-19 PROCEDURE — 1036F TOBACCO NON-USER: CPT | Performed by: STUDENT IN AN ORGANIZED HEALTH CARE EDUCATION/TRAINING PROGRAM

## 2025-06-19 PROCEDURE — 99215 OFFICE O/P EST HI 40 MIN: CPT | Performed by: STUDENT IN AN ORGANIZED HEALTH CARE EDUCATION/TRAINING PROGRAM

## 2025-06-19 ASSESSMENT — ENCOUNTER SYMPTOMS
DEPRESSION: 0
LOSS OF SENSATION IN FEET: 0
OCCASIONAL FEELINGS OF UNSTEADINESS: 0

## 2025-06-19 NOTE — PATIENT INSTRUCTIONS
Thank you for visiting the  Genetics Clinic.     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  Questions and concerns were addressed. The plan was reviewed as outlined below.    Recommendations:  1) Rheumatology evaluation    The clinic note with full evaluation and today's final recommendations are to be sent to the referring physician/PCP.    Please call [phone] to schedule Genetics follow-up if other concerns arise.    Maay Mills MD  Genetic Medicine

## 2025-06-19 NOTE — PROGRESS NOTES
"  Genetics Department  [address] [place] [address]  P: [phone]  F: [phone]      Subjective:   Reason for Visit:   Alina is a 35 y.o. 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 Alina is being seen in consultation at the request of Dr. Cherise Soni. The information for this visit was obtained from the patient and the medical records.        History of Present Illness:    She states that she has had joint pain in her wrists, fingers, hip, and back over the past few years.  The joint pain occurs daily. Her pain that radiates down her thigh have been in the past couple of years as well. Sitting for a long period of time will worsen the pain.  She has some joint swelling and stiffness in the morning and after sitting for awhile. She has not noticed any redness or warmth of the joints.  She has experienced some pain in her arm muscles and back muscles.    She has been evaluated by a neurosurgery provider in the spine clinic on 5/8/25 for her lower back pain. Per that note, she has persistent pain that \"occasionally radiates down her posterior thigh however more frequently is in her anterior thigh... movement frequently exacerbates that and it alleviates somewhat with rest.\"  She had some relief with PT.  On their exam, she had normal muscle bulk and strength, midline tenderness was present L5-S1, and paraspinal muscle spasm/tenderness was present R>L at L5-S1. Imaging was recommended as was PT.  They would like to attempt non-surgical interventions first.    She has a history of a hairline fracture of her back.      No history of joint laxity/dislocation.  No history of unusual fractures, scoliosis, or pectus differences.   No history of skin fragility, skin hyperextensibility, stretch marks not related to weight change, abnormal scar formation or atrophic scars.  She states that she may have easy bruising.   No " "history of dental crowding or gingival disease.  No history of POTS or pneumothorax.  No history of recurrent hernias.   No history of lens dislocation or high myopia. She is nearsighted and wears glasses.  She states that she doesn't typically wear glasses unless she is driving.    MRI lumbar spine 6/13/25: \"L5 anterolisthesis measuring 6 mm.  Mild lower lumbar facet arthrosis.  Otherwise unremarkable lumbar spine MRI. There is no evidence of significant central canal or neural foraminal stenosis.\"    X-rays hip 4/7/25: \"There is no acute fracture or dislocation. Right hip joint articulation is maintained. No significant degenerative changes are noted. No obvious soft tissue abnormality.\"    TSH W/REFLEX TO FT4   Date/Time Value Ref Range Status   05/19/2025 09:50 AM 0.98 mIU/L Final     Comment:               Reference Range                         > or = 20 Years  0.40-4.50                              Pregnancy Ranges            First trimester    0.26-2.66            Second trimester   0.55-2.73            Third trimester    0.43-2.91        VITAMIN D,25-OH,TOTAL,IA   Date/Time Value Ref Range Status   05/19/2025 09:50 AM 22 (L) 30 - 100 ng/mL Final     Comment:     Vitamin D Status         25-OH Vitamin D:     Deficiency:                    <20 ng/mL  Insufficiency:             20 - 29 ng/mL  Optimal:                 > or = 30 ng/mL     For 25-OH Vitamin D testing on patients on   D2-supplementation and patients for whom quantitation   of D2 and D3 fractions is required, the QuestAssureD()  25-OH VIT D, (D2,D3), LC/MS/MS is recommended: order   code 07236 (patients >2yrs).     See Note 1     Note 1     For additional information, please refer to   http://education.[employer] Volume Wizard App/faq/XJG527   (This link is being provided for informational/  educational purposes only.)          Past Surgical History:  Alina  has a past surgical history that includes Bartholin gland cyst excision (Right).    Behavior " "History:  she has a history of generalized anxiety disorder.    Pregnancy History: She was pregnant at 25yo and had gestational DM and \"mild pre-eclampsia.\"  She had a  without complications.  With her second pregnancy, she had gDM and vaginal delivery.    Social History: Lives with  and daughters.    Dietary History: She does not have food aversions, allergies, or special dietary requirements/restrictions.    Sleep History: she states her sleep is decent and no snoring.     Family History:  A multigenerational family history was obtained as part of the visit and pertinent positives and negatives are reviewed here.  The complete pedigree will be scanned into the patient EHR.   Her father is in his 50's and has a history of high blood pressure, high cholesterol, and back issues including bulging discs.  Her mother is in her 50's and is A+W. She has an 8 year old girl who is A+W.  She has a 6 year old girl who has a history of asthma.    Her 33yo brother has a history of anxiety.  Her 26yo sister has a history of anxiety.  Her 24yo brother has a history of anxiety.  She has twin nieces who are ~5yo and A+W.  She has a 2yo niece who has a history of autism.  The rest of the nieces/nephew are A+W.   On the maternal side of the family, her grandfather is 81yo and has a history of T2DM, heart problems, ?pancreatic cancer, knee replacements, spine issues, and ankylosing spondylolysis.  Her MGM has a history of alcohol abuse, arthritis, hip replacement, a mitral valve issue, irregular heart beat and high blood pressure. Her maternal first cousin has a history of bipolar disorder.   On the paternal side of the family, her grandfather is  and had cancer of the blood that was diagnosed in his 70's.  His PGM has a history of bipolar disorder, AHDD, COPD, smoking, and alcohol abuse.  Her paternal aunt has a history of a stroke.  Her paternal first cousin has a history of thyroid issues.   The remainder of the " history is negative for congenital anomalies, pneumothoraces, blindness, hearing loss, and known genetic diseases.  Consanguinity is denied. Ancestry is white/Maori on the paternal side of the family and [place]/Kiswahili on the maternal side of the family.    Review of Systems:  A full review of systems was reviewed with the family.  Pertinent positives listed in the HPI.  All other systems negative.      MEDICATIONS:   Current Medications[1]    ALLERGIES:   Alina is allergic to nickel.     Objective:     Physical Exam  Beighton score   1. Passive dorsiflexion and hyperextension of the fifth MCP joint beyond 90°: 0  2. Passive apposition of the thumb to the flexor aspect of the forearm: 2  3. Passive hyperextension of the elbow beyond 10°: 2  4. Passive hyperextension of the knee beyond 10°: 2  5. Active forward flexion of the trunk with the knees fully extended so that the palms of the hands rest flat on the floor: 0  Total   6/9    HEENT Normocephalic with normal hair distribution and pattern; symmetric face; pupils equal, round, and reactive to light bilaterally;  normal nose; palate not high arched or narrow; uvula single and midline.  Symmetric facial movements.  Dentition is present.   Neck Supple with no extra skin or webbing.   Chest Clear to auscultation bilaterally; no pectus deformity.   CV Regular rate and rhythm with no murmurs.    Abdomen Soft, nondistended, NT to palpation bowel sounds present.   Extremities Normally formed digits with normal nails and creases;  Wrist sign negative. Thumb sign negative. Bilateral piezogenic papules. Hindfoot deformity-, flat feet-   Skin No areas visible of hyper or hypopigmentation; No abnormal scars. Skin hyperextensibility not present using volar surface of nondominant forearm.   Neuro Casual gait wnl.       Assessment and Plan:     Alina is a 35 y.o. 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  Her Beighton score today was a 6 out of 9.   She has generalized joint hypermobility.  Discussed that at this time, there is no gene associated with hypermobile Dione-Danlos syndrome (hEDS).  As such, there are strict clinical criteria that needs to be met in order to be diagnosed with hEDS.  Explained why she does not meet the clinical currently.  She has not been evaluated by rheumatology previously.  She has a family history of ankylosis spondylitis.  Will refer to rheumatology.   Of note she has a maternal grandfather with possibly pancreatic cancer (had part of pancreas removed due to cancer).  Briefly discussed the genetics of cancer and that he may benefit from a cancer genetics evaluation.  Her niece with autism may also want to consider a genetics evaluation and spoke briefly about genetics and autism.    - Rheumatology evaluation      We would like Alina to follow up in clinic if new questions or concerns arise. An appointment can be made by calling [phone].     All results will be discussed with the patient/family at the scheduled follow-up appointment unless other arrangements are made at the time of the visit.      The information we discussed is what is known as of this date. With the rapid pace of medical and genetic research, new discoveries may modify our assessment and approach to this patient and/or family in the future.     All of the patient's/parent's questions were answered and contact information was provided.     Thank you for involving us with the care of Alina.     This was a clinical encounter in which I spent greater than 75 minutes engaged in activities related to this visit which included records review, preparing to see the patient, pedigree analysis, completing the evaluation, counseling, documentation, and coordination.  We discussed hypermobile Dione Danlos syndrome (hEDS), genetic principles, and lack of genetic  testing options for hEDS.    Maya Mills MD  Medical Geneticist         [1]   Current Outpatient Medications:     cholecalciferol (Vitamin D-3) 25 MCG (1000 UT) capsule, Take by mouth., Disp: , Rfl:     ergocalciferol (Vitamin D-2) 1250 mcg (50,000 units) capsule, Take 1 capsule (1.25 mg) by mouth 1 (one) time per week., Disp: 12 capsule, Rfl: 0    multivitamin-min-iron-FA-vit K (Multi For Her) 18 mg iron-600 mcg-40 mcg capsule, Take by mouth., Disp: , Rfl:     sertraline (Zoloft) 50 mg tablet, Take 1 tablet (50 mg) by mouth once daily., Disp: 90 tablet, Rfl: 3

## 2025-06-19 NOTE — LETTER
06/19/25    Cherise Soni DO  [address]  [facility]  [facility] [address]      Dear Dr. Cherise Soni DO,    I am writing to confirm that your patient, Alina Carter  received care in my office on 06/19/25. I have enclosed a summary of the care provided to Alina for your reference.    Please contact me with any questions you may have regarding the visit.    Sincerely,         Maya Mills MD  [address] [facility] [address]  [facility] [address]  [phone]    CC: No Recipients

## 2025-06-19 NOTE — LETTER
06/19/25    Cherise Soni DO  [address]  [facility]  [facility] [address]      Dear Dr. Cherise Soni DO,    Thank you for referring your patient, Alina Carter, to receive care in my office. I have enclosed a summary of the care provided to Alina on 06/19/25.    Please contact me with any questions you may have regarding the visit.    Sincerely,         Maya Mills MD  [address]  [facility] [address]  [facility] [address]  [phone]    CC: No Recipients

## 2025-06-24 ENCOUNTER — TELEPHONE (OUTPATIENT)
Dept: NEUROSURGERY | Facility: CLINIC | Age: 35
End: 2025-06-24
Payer: COMMERCIAL

## 2025-06-24 NOTE — TELEPHONE ENCOUNTER
Result Communication    Resulted Orders   MR lumbar spine wo IV contrast    Narrative    Interpreted By:  Castro Jara,   STUDY:  MR LUMBAR SPINE WO IV CONTRAST; ;  6/13/2025 11:55 am      INDICATION:  Signs/Symptoms:lumbar radiculopathy spondylosis.      COMPARISON:  None.      ACCESSION NUMBER(S):  [id number]      ORDERING CLINICIAN:  SAMANTHA MEESON      TECHNIQUE:  Multiplanar and multisequential MR images of the lumbar spine are  performed.      The study is limited by motion degradation.      FINDINGS:  There is slight levo convexity of the lower lumbar spine. There is  bilateral L5 spondylolysis. There is anterolisthesis of L5 measuring  6 mm. The remaining lumbar alignment is within normal limits.      The lumbar disc space heights and signal are preserved. The lumbar  vertebral body heights are maintained. There is no acute bone marrow  edema or osseous mass.      The conus medullaris is of normal morphology and signal. The tip of  the conus descends to the L1 level.      Axial images are performed through the lumbar disc spaces from the  mid T12 level through S1.      The T12-L1 disc space level is unremarkable.      The L1-2 disc space level is unremarkable.      The L2-3 disc space level demonstrates mild bilateral facet  arthrosis. There is no central canal or neural foraminal stenosis.      The L3-4 disc space level demonstrates mild bilateral facet  arthrosis. There is no central canal or neural foraminal stenosis.      The L4-5 disc space level demonstrates mild bilateral facet  arthrosis. There is no central canal or neural foraminal stenosis.      The L5-S1 disc space level demonstrates mild bilateral facet  arthrosis. There is no central canal or neural foraminal stenosis.        Impression    L5 anterolisthesis measuring 6 mm.      Mild lower lumbar facet arthrosis.      Otherwise unremarkable lumbar spine MRI. There is no evidence of  significant central canal or neural foraminal  stenosis.          MACRO:  None      Signed by: Castro Marek 6/14/2025 12:04 PM  Dictation workstation:   [id number]       8:52 AM      Results were successfully communicated with the patient and they acknowledged their understanding.    Discussed with patient there were no significant surgical findings on her imaging.  She does have a slight grade 1 L5 over S1 anterolisthesis which is stable when compared to prior MRI.  Discussed with patient that this should be managed conservatively with stretching and physical therapy.  If pain more to become more consistent and radiate down her lower extremities she can see pain medicine for an evaluation for epidural spinal infections.  Patient was glad to hear that she did not require any surgical interventions at this time.  She will follow-up with us as needed.    Samantha Meeson, MSN, NP-C  Adult-Gerontology Associate Nurse Practitioner  Department of Neurosurgery- Spine [place]  Main phone [phone]  Fax [phone]

## 2025-08-01 DIAGNOSIS — F41.1 GENERALIZED ANXIETY DISORDER: Primary | ICD-10-CM

## 2025-08-01 LAB — NON-UH HIE GP HPV: NEGATIVE

## 2025-08-01 RX ORDER — ESCITALOPRAM OXALATE 10 MG/1
10 TABLET ORAL DAILY
Qty: 30 TABLET | Refills: 5 | Status: SHIPPED | OUTPATIENT
Start: 2025-08-01 | End: 2026-01-28

## 2025-08-04 LAB — NON-UH HIE THINPREP TIS PAP: NORMAL

## 2025-09-02 ASSESSMENT — ENCOUNTER SYMPTOMS
COUGH: 0
NECK PAIN: 0
VOMITING: 0
DIARRHEA: 0
RHINORRHEA: 0
HEADACHES: 0
ABDOMINAL PAIN: 0
SORE THROAT: 0

## 2025-09-03 ENCOUNTER — OFFICE VISIT (OUTPATIENT)
Dept: PRIMARY CARE | Facility: CLINIC | Age: 35
End: 2025-09-03
Payer: COMMERCIAL

## 2025-09-03 VITALS
TEMPERATURE: 97.9 F | RESPIRATION RATE: 12 BRPM | BODY MASS INDEX: 19.58 KG/M2 | DIASTOLIC BLOOD PRESSURE: 68 MMHG | WEIGHT: 131 LBS | SYSTOLIC BLOOD PRESSURE: 112 MMHG | HEART RATE: 75 BPM | OXYGEN SATURATION: 99 %

## 2025-09-03 DIAGNOSIS — H61.21 IMPACTED CERUMEN OF RIGHT EAR: ICD-10-CM

## 2025-09-03 DIAGNOSIS — B95.5 PERIANAL STREPTOCOCCAL CELLULITIS: Primary | ICD-10-CM

## 2025-09-03 DIAGNOSIS — K61.0 PERIANAL STREPTOCOCCAL CELLULITIS: Primary | ICD-10-CM

## 2025-09-03 DIAGNOSIS — H93.8X1 SENSATION OF FULLNESS IN RIGHT EAR: ICD-10-CM

## 2025-09-03 PROCEDURE — 69209 REMOVE IMPACTED EAR WAX UNI: CPT | Performed by: FAMILY MEDICINE

## 2025-09-03 PROCEDURE — 99213 OFFICE O/P EST LOW 20 MIN: CPT | Performed by: FAMILY MEDICINE

## 2025-09-03 PROCEDURE — 1036F TOBACCO NON-USER: CPT | Performed by: FAMILY MEDICINE

## 2025-09-03 RX ORDER — AMOXICILLIN 875 MG/1
875 TABLET, COATED ORAL 2 TIMES DAILY
Qty: 20 TABLET | Refills: 0 | Status: SHIPPED | OUTPATIENT
Start: 2025-09-03 | End: 2025-09-13

## 2025-09-03 ASSESSMENT — ENCOUNTER SYMPTOMS
COUGH: 0
HEADACHES: 0
FEVER: 0
CHILLS: 0
SORE THROAT: 0
ABDOMINAL PAIN: 0
RHINORRHEA: 0
VOMITING: 0
NECK PAIN: 0
DIARRHEA: 0

## 2025-09-16 ENCOUNTER — APPOINTMENT (OUTPATIENT)
Dept: RHEUMATOLOGY | Facility: CLINIC | Age: 35
End: 2025-09-16
Payer: COMMERCIAL

## 2026-05-20 ENCOUNTER — APPOINTMENT (OUTPATIENT)
Dept: PRIMARY CARE | Facility: CLINIC | Age: 36
End: 2026-05-20
Payer: COMMERCIAL